# Patient Record
Sex: MALE | Race: WHITE | Employment: OTHER
[De-identification: names, ages, dates, MRNs, and addresses within clinical notes are randomized per-mention and may not be internally consistent; named-entity substitution may affect disease eponyms.]

---

## 2022-10-31 ENCOUNTER — TRANSCRIBE ORDERS (OUTPATIENT)
Dept: OTHER | Age: 67
End: 2022-10-31

## 2022-10-31 DIAGNOSIS — R26.81 UNSTEADINESS ON FEET: Primary | ICD-10-CM

## 2022-12-30 ENCOUNTER — HOSPITAL ENCOUNTER (OUTPATIENT)
Dept: OCCUPATIONAL THERAPY | Facility: REHABILITATION | Age: 67
Discharge: HOME | End: 2022-12-30
Attending: PHYSICIAN ASSISTANT
Payer: COMMERCIAL

## 2022-12-30 DIAGNOSIS — Z78.9 DECREASED ACTIVITIES OF DAILY LIVING (ADL): Primary | ICD-10-CM

## 2022-12-30 DIAGNOSIS — R26.81 UNSTEADINESS ON FEET: ICD-10-CM

## 2022-12-30 PROCEDURE — 97165 OT EVAL LOW COMPLEX 30 MIN: CPT | Mod: GO | Performed by: OCCUPATIONAL THERAPIST

## 2022-12-30 PROCEDURE — 97112 NEUROMUSCULAR REEDUCATION: CPT | Mod: GO | Performed by: OCCUPATIONAL THERAPIST

## 2022-12-30 NOTE — PROGRESS NOTES
[facility]          OUTPATIENT OCCUPATIONAL THERAPY  EVALUATION  PLAN OF TREATMENT FOR OUTPATIENT REHABILITATION  (COMPLETE FOR INITIAL CLAIMS ONLY)  Patient's Last Name, First Name, M.I.  YOB: 1955  Дмитрий Jessica                        Provider's Name  [facility] Medical Record No.  [id number]                               Onset Date:     10/28/22   Start of Care Date:     12/30/22   Type:     ___PT   _X_OT   ___SLP Medical Diagnosis:     unsteadiness                          OT Diagnosis:     unsteadiness on feet, decreased ADL and IADL function Visits from SOC:  1   _________________________________________________________________________________  Plan of Treatment/Functional Goals:  Neuromuscular re-education     Goals  Goal Description: Patient will be able to bend to get dressed without unsteadiness  Target Date: 01/29/23    Therapy Frequency: one visit     Predicted Duration of Therapy Intervention (days/wks): 4 weeks  Mary Kate Becker OT          I CERTIFY THE NEED FOR THESE SERVICES FURNISHED UNDER        THIS PLAN OF TREATMENT AND WHILE UNDER MY CARE .             Physician Signature               Date    X_____________________________________________________               Certification date from: 12/30/22, Certification date to: 01/29/23               Referring Physician: Tawanna Banks PA-C     Initial Assessment        See Epic Evaluation      Start Of Care Date: 12/30/22 12/30/22 1200   Quick Adds   Quick Adds Certification;Vestibular Eval   Type of Visit Initial Outpatient Occupational Therapy Evaluation   General Information   Start Of Care Date 12/30/22   Referring Physician Tawanna Banks PA-C   Orders Evaluate and treat as indicated   Orders Date 10/28/22   Medical Diagnosis unsteadiness   Onset of Illness/Injury or Date of Surgery  "10/28/22   Precautions/Limitations Fall precautions   Surgical/Medical History Reviewed Yes   Additional Occupational Profile Info/Pertinent History of Current Problem Patient presents with unsteadiness for the past year. He has fallen 3 times with one fall resulting in fractured ribs. Patient denies ear pain, tinnitus, nausea, dizziness or vertigo. Hearing test showed some symmetrical hearing loss and patient states that ENT was not concerned. ENT referred for rehab as they feel his unsteadiness is \"likely related to decreased proprioception and is musculoskeletal in nature\".   Pain   Patient currently in pain No   Fall Risk Screen   Fall screen completed by OT   Have you fallen 2 or more times in the past year? Yes   Have you fallen and had an injury in the past year? Yes   Is patient a fall risk? Yes;Referral initiated   Fall screen comments Patient to transfer to PT after initial OT visit today.   Abuse Screen (yes response referral indicated)   Feels Unsafe at Home or Work/School no   Feels Threatened by Someone no   Does Anyone Try to Keep You From Having Contact with Others or Doing Things Outside Your Home? no   Physical Signs of Abuse Present no   Patient needs abuse support services and resources No   Visual Perception   Visual Perception Comments Patient reports normal vision. ENT noted dysconjugate eye movement   Sensation   Sensation Comments patient reports numbness in both feet   Oculomotor Exam   Smooth Pursuit Normal   Saccades Comments Normal   Convergence Testing Normal   Other Oculomotor Exam Comments ENT performed and found normal head thrust test bilaterally   Infrared Goggle Exam or Frenzel Lense Exam   Vestibular Suppressant in Last 24 Hours? No   Exam completed with Infrared Goggles   Spontaneous Nystagmus Horizontal R   Spontaneous Nystagmus comments suppresses with fixation   Gaze Evoked Nystagmus Horizontal L   Gaze Evoked Nystagmus Comments very subtle   Head Shake Horizontal Nystagmus " Negative   [place]-Hallpike (right) Negative   [place]-Hallpike (Left) Negative   HSCC Supine Roll Test (Right) Negative   HSCC Supine Roll Test (Left) Negative   Planned Therapy Interventions   Planned Therapy Interventions Neuromuscular re-education    OT Goal 1   Goal Description Patient will be able to bend to get dressed without unsteadiness   Target Date 01/29/23   Clinical Impression   Criteria for Skilled Therapeutic Interventions Met Yes, treatment indicated   OT Diagnosis unsteadiness on feet, decreased ADL and IADL function   Clinical Decision Making (Complexity) Low complexity   Therapy Frequency one visit   Predicted Duration of Therapy Intervention (days/wks) 4 weeks   Risks and Benefits of Treatment have been explained. Yes   Patient, Family & other staff in agreement with plan of care Yes   Clinical Impression Comments Patient has unsteadiness that is multifactorial in nature. Initiated HEP today and will transfer to PT as he has musculoskeletal and gait issues, as well as numbness in feet and this would be more appropriate for PT. Requested order from ENT.   Education Assessment   Barriers To Learning No Barriers   Therapy Certification   Certification date from 12/30/22   Certification date to 01/29/23   Certification I certify the need for these services furnished under this plan of treatment and while under my care.  (Physician co-signature of this document indicates review and certification of the therapy plan)   Total Evaluation Time   OT Eval, Low Complexity Minutes (48446) 25

## 2023-01-26 ENCOUNTER — HOSPITAL ENCOUNTER (OUTPATIENT)
Dept: PHYSICAL THERAPY | Facility: REHABILITATION | Age: 68
Discharge: HOME | End: 2023-01-26
Payer: COMMERCIAL

## 2023-01-26 ENCOUNTER — MEDICAL CORRESPONDENCE (OUTPATIENT)
Dept: HEALTH INFORMATION MANAGEMENT | Facility: CLINIC | Age: 68
End: 2023-01-26

## 2023-01-26 DIAGNOSIS — R26.89 BALANCE PROBLEMS: ICD-10-CM

## 2023-01-26 DIAGNOSIS — R26.89 ABNORMALITY OF GAIT DUE TO IMPAIRMENT OF BALANCE: Primary | ICD-10-CM

## 2023-01-26 PROCEDURE — 97161 PT EVAL LOW COMPLEX 20 MIN: CPT | Mod: GP

## 2023-01-26 PROCEDURE — 97112 NEUROMUSCULAR REEDUCATION: CPT | Mod: GP

## 2023-01-26 NOTE — PROGRESS NOTES
"   01/26/23 0900   Quick Adds   Quick Adds Certification   Type of Visit Initial OP PT Evaluation   General Information   Start of Care Date 01/26/23   Referring Physician Tawanna Delong PA-C   Orders Evaluate and Treat as Indicated   Order Date 01/26/23   Medical Diagnosis Unsteadiness on feet   Onset of illness/injury or Date of Surgery 01/26/23  (order date- been having symptoms for 1-2 years)   Precautions/Limitations fall precautions   Surgical/Medical history reviewed Yes   Pertinent history of current problem (include personal factors and/or comorbidities that impact the POC) Per chart review: \"Дмитрий Jessica is a 66 y.o. male with alcohol abuse, hypertension, hyperlipidemia, moderately well controlled COPD, paroxysmal atrial fibrillation (on Xarelto, status post ablation), HFpEF, nonobstructive CAD, and aortic root dilation who is presenting today with concerns for balance issues and tingling in his feet.\" Patient has been experiencing these issues for the past year. He mainly loses his balance when he is backing up or turning to the right. Also has a 2yr hx of chronic mid thoracic/low back pain which comes on after 5 minutes of standing of walking. States he is fairly sedentary throughout his day. He does have a TM that he uses daily but only for 5 minutes as that is all he can tolerate d/t pain. The tingling in his feet has gotten progressively worse over the past year. He started going to the chiropractor this week but hasn't noticed a difference yet.   Patient role/Employment history Retired  (prior )   Living environment House/[facility]  (Lives alone)   Home/Community Accessibility Comments All one level except laundry is downstairs with 1sided railing. 5-6 stairs to get into house with railing.   ADL Devices Shower/Tub Grab Bar   Patient/Family Goals Statement Improve balance   Fall Risk Screen   Fall screen completed by PT   Have you fallen 2 or more times in the past year? Yes "   Have you fallen and had an injury in the past year? Yes   Is patient a fall risk? Yes   Fall screen comments First fall was 8 months ago: backing up and fell and hit rocking chair and fractured a couple ribs. Second fall was 2 months ago: got up to go to the bathroom at night and fell into the bathtub as he couldn't see.   Pain   Patient currently in pain Yes   Pain location Mid to low back   Pain comments Chronic back pain for past 2 years. Increase in pain after standing or walking for 5 minutes.   Observation   Observation Flat affect, speaks in short sentences with 1-2 word phrases.   Posture   Posture Comments In standing maintains slight bend in both knees, mild rounding of upper back and shoulders.   Range of Motion (ROM)   ROM Comment Gross LE ROM screen WFL bilaterally, lacking end range of motion with knee extension indicating tight hamstrings B.   Strength   Strength Comments Gross strength observed through MMT showing 5/5 in all LE mm. B. Good strength throughout.   Gait   Gait Comments Ambulates with mildly shorter steps, limiting full hip extension throughout gait cycle. Maintains slight bend in hips/knees throughout. Decreased arm swing.   Gait Special Tests   Gait Special Tests FUNCTIONAL GAIT ASSESSMENT   Gait Special Tests Functional Gait Assessment Score out of 30   Score out of 30 21/30   Comments Indicates fall risk. Difficulty with tandem walking and eyes closed.   Balance   Balance Comments Overall fair balance. Has the most difficulty on uneven surfaces and when vision is compromised. 5x STS: 15.4 seconds using UEs to push off, does not stand up fully, maintaining slightly flexed posture.   Balance Special Tests   Balance Special Tests Sit to stand reps;Modified CTSIB Conditions   Balance Special Tests Modified CTSIB Conditions   Condition 1, seconds 30 Seconds   Condition 2, seconds 30 Seconds   Condition 4, seconds 30 Seconds   Condition 5, seconds 2 Seconds   Planned Therapy  Interventions   Planned Therapy Interventions balance training;gait training;neuromuscular re-education;ROM;strengthening;stretching   Clinical Impression   Criteria for Skilled Therapeutic Interventions Met yes, treatment indicated   PT Diagnosis Impaired balance affecting gait   Influenced by the following impairments Hx of long term alcohol abuse, impaired sensation in feet, impaired balance   Functional limitations due to impairments Increased risk for falls, increased falls, decreased safety in home and community   Clinical Presentation Stable/Uncomplicated   Clinical Presentation Rationale symptom report, clinical judgment   Clinical Decision Making (Complexity) Low complexity   Therapy Frequency 1 time/week  (decreasing prn)   Predicted Duration of Therapy Intervention (days/wks) 90 days   Risk & Benefits of therapy have been explained Yes   Patient, Family & other staff in agreement with plan of care Yes   Clinical Impression Comments Дмитрий is a 68 yo male who presents with impaired balance, affecting gait. He has the most difficulty with backing up, when vision is compromised, and when on uneven surfaces. He will benefit from skilled PT intervention targeting his impairments in order to decrease risk for falls and improve safety.   GOALS   PT Eval Goals 1;2;3   Goal 1   Goal Identifier FGA   Goal Description Дмитрий will improve score on FGA by 3 or more points in order to demonstrate meaningful improvement in dynamic balance.   Target Date 04/25/23   Goal 2   Goal Identifier 5x STS   Goal Description Дмитрий will improve score on 5x STS by 2 or more seconds and without UE use in order to improve functional LE strength needed for improved gait and mobility.   Target Date 04/25/23   Goal 3   Goal Identifier Uneven surfaces   Goal Description Дмитрий will maintain a NBOS on a foam pad with EC for at least 15 seconds in order to improve response of vestibular system to aid in overall balance.   Target Date  04/25/23   Total Evaluation Time   PT Nikos, Low Complexity Minutes (61148) 25   Therapy Certification   Certification date from 01/26/23   Certification date to 04/25/23   Medical Diagnosis Unsteadiness on feet   Certification I certify the need for these services furnished under this plan of treatment and while under my care.  (Physician co-signature of this document indicates review and certification of the therapy plan).       Thank you for referring Дмитрий to outpatient physical therapy. Please do not hesitate to contact me with any questions via email at [email or web address].     Rachid Burk, PT, DPT  [employer]   [email or web address]

## 2023-01-26 NOTE — PROGRESS NOTES
[facility]                                                                                   OUTPATIENT PHYSICAL THERAPY FUNCTIONAL EVALUATION  PLAN OF TREATMENT FOR OUTPATIENT REHABILITATION  (COMPLETE FOR INITIAL CLAIMS ONLY)  Patient's Last Name, First Name, M.I.  YOB: 1955  Дмитрий Jessica     Provider's Name   [facility]   Medical Record No.  [id number]     Start of Care Date:  01/26/23   Onset Date:  01/26/23 (order date- been having symptoms for 1-2 years)   Type:     _X__PT   ____OT  ____SLP Medical Diagnosis:  Unsteadiness on feet     PT Diagnosis:  Impaired balance affecting gait Visits from SOC:  1                              __________________________________________________________________________________  Plan of Treatment/Functional Goals:  balance training, gait training, neuromuscular re-education, ROM, strengthening, stretching           GOALS  FGA  Дмитрий will improve score on FGA by 3 or more points in order to demonstrate meaningful improvement in dynamic balance.  04/25/23    5x STS  Дмитрий will improve score on 5x STS by 2 or more seconds and without UE use in order to improve functional LE strength needed for improved gait and mobility.  04/25/23    Uneven surfaces  Дмитрий will maintain a NBOS on a foam pad with EC for at least 15 seconds in order to improve response of vestibular system to aid in overall balance.  04/25/23         Therapy Frequency:  1 time/week (decreasing prn)   Predicted Duration of Therapy Intervention:  90 days    NISA LAWTON, PT                                    I CERTIFY THE NEED FOR THESE SERVICES FURNISHED UNDER        THIS PLAN OF TREATMENT AND WHILE UNDER MY CARE .             Physician Signature               Date    X_____________________________________________________                      Certification  Date From:  01/26/23   Certification Date To:  04/25/23    Referring Provider:  Tawanna Delong PA-C    Initial Assessment  See Epic Evaluation- Start of Care Date: 01/26/23

## 2023-01-27 ENCOUNTER — TRANSCRIBE ORDERS (OUTPATIENT)
Dept: OTHER | Age: 68
End: 2023-01-27

## 2023-01-27 DIAGNOSIS — R26.81 UNSTEADINESS ON FEET: Primary | ICD-10-CM

## 2023-02-13 NOTE — PROGRESS NOTES
[facility]    Outpatient Occupational Therapy Discharge Note  Patient: Дмитрий Jessica  : 1955    Beginning/End Dates of Reporting Period:  22     Referring Provider: Tawanna Banks PA-C    Therapy Diagnosis: unsteadiness on feet, decreased ADL and IADL function    Goals: N/A. Patient seen for initial visit and transferred to PT    Plan:  Discharge from therapy.

## 2023-02-13 NOTE — ADDENDUM NOTE
Encounter addended by: Mary Kate Becker, OT on: 2/12/2023 7:25 PM   Actions taken: Episode resolved, Clinical Note Signed, Flowsheet accepted

## 2023-03-15 ENCOUNTER — HOSPITAL ENCOUNTER (OUTPATIENT)
Dept: PHYSICAL THERAPY | Facility: REHABILITATION | Age: 68
Discharge: HOME | End: 2023-03-15
Payer: COMMERCIAL

## 2023-03-15 DIAGNOSIS — R26.89 ABNORMALITY OF GAIT DUE TO IMPAIRMENT OF BALANCE: ICD-10-CM

## 2023-03-15 DIAGNOSIS — R26.89 BALANCE PROBLEMS: ICD-10-CM

## 2023-03-15 DIAGNOSIS — M62.81 GENERALIZED MUSCLE WEAKNESS: Primary | ICD-10-CM

## 2023-03-15 PROCEDURE — 97110 THERAPEUTIC EXERCISES: CPT | Mod: CQ | Performed by: PHYSICAL THERAPY ASSISTANT

## 2023-03-15 PROCEDURE — 97112 NEUROMUSCULAR REEDUCATION: CPT | Mod: CQ | Performed by: PHYSICAL THERAPY ASSISTANT

## 2023-03-22 ENCOUNTER — HOSPITAL ENCOUNTER (OUTPATIENT)
Dept: PHYSICAL THERAPY | Facility: REHABILITATION | Age: 68
Discharge: HOME | End: 2023-03-22
Payer: COMMERCIAL

## 2023-03-22 DIAGNOSIS — R26.89 BALANCE PROBLEMS: ICD-10-CM

## 2023-03-22 DIAGNOSIS — M62.81 GENERALIZED MUSCLE WEAKNESS: Primary | ICD-10-CM

## 2023-03-22 DIAGNOSIS — R26.89 ABNORMALITY OF GAIT DUE TO IMPAIRMENT OF BALANCE: ICD-10-CM

## 2023-03-22 PROCEDURE — 97110 THERAPEUTIC EXERCISES: CPT | Mod: CQ | Performed by: PHYSICAL THERAPY ASSISTANT

## 2023-03-22 PROCEDURE — 97112 NEUROMUSCULAR REEDUCATION: CPT | Mod: CQ | Performed by: PHYSICAL THERAPY ASSISTANT

## 2023-03-29 ENCOUNTER — HOSPITAL ENCOUNTER (OUTPATIENT)
Dept: PHYSICAL THERAPY | Facility: REHABILITATION | Age: 68
Discharge: HOME | End: 2023-03-29
Payer: COMMERCIAL

## 2023-03-29 DIAGNOSIS — R26.89 BALANCE PROBLEMS: ICD-10-CM

## 2023-03-29 DIAGNOSIS — M62.81 GENERALIZED MUSCLE WEAKNESS: Primary | ICD-10-CM

## 2023-03-29 DIAGNOSIS — R26.89 ABNORMALITY OF GAIT DUE TO IMPAIRMENT OF BALANCE: ICD-10-CM

## 2023-03-29 PROCEDURE — 97110 THERAPEUTIC EXERCISES: CPT | Mod: CQ | Performed by: PHYSICAL THERAPY ASSISTANT

## 2023-03-29 PROCEDURE — 97112 NEUROMUSCULAR REEDUCATION: CPT | Mod: CQ | Performed by: PHYSICAL THERAPY ASSISTANT

## 2023-04-20 ENCOUNTER — HOSPITAL ENCOUNTER (OUTPATIENT)
Dept: PHYSICAL THERAPY | Facility: REHABILITATION | Age: 68
Discharge: HOME | End: 2023-04-20
Payer: COMMERCIAL

## 2023-04-20 DIAGNOSIS — M62.81 GENERALIZED MUSCLE WEAKNESS: Primary | ICD-10-CM

## 2023-04-20 DIAGNOSIS — R26.89 BALANCE PROBLEMS: ICD-10-CM

## 2023-04-20 DIAGNOSIS — R26.89 ABNORMALITY OF GAIT DUE TO IMPAIRMENT OF BALANCE: ICD-10-CM

## 2023-04-20 PROCEDURE — 97112 NEUROMUSCULAR REEDUCATION: CPT | Mod: GP | Performed by: PHYSICAL THERAPY ASSISTANT

## 2023-04-20 PROCEDURE — 97110 THERAPEUTIC EXERCISES: CPT | Mod: GP | Performed by: PHYSICAL THERAPY ASSISTANT

## 2023-04-27 ENCOUNTER — HOSPITAL ENCOUNTER (OUTPATIENT)
Dept: PHYSICAL THERAPY | Facility: REHABILITATION | Age: 68
Discharge: HOME | End: 2023-04-27
Payer: COMMERCIAL

## 2023-04-27 DIAGNOSIS — M62.81 GENERALIZED MUSCLE WEAKNESS: ICD-10-CM

## 2023-04-27 DIAGNOSIS — R26.89 ABNORMALITY OF GAIT DUE TO IMPAIRMENT OF BALANCE: Primary | ICD-10-CM

## 2023-04-27 DIAGNOSIS — R26.89 BALANCE PROBLEMS: ICD-10-CM

## 2023-04-27 PROCEDURE — 97112 NEUROMUSCULAR REEDUCATION: CPT | Mod: GP

## 2023-04-27 PROCEDURE — 97750 PHYSICAL PERFORMANCE TEST: CPT | Mod: GP

## 2023-04-27 PROCEDURE — 97110 THERAPEUTIC EXERCISES: CPT | Mod: GP

## 2023-04-28 NOTE — PROGRESS NOTES
[facility]    OUTPATIENT PHYSICAL THERAPY  PLAN OF TREATMENT FOR OUTPATIENT REHABILITATION AND PROGRESS NOTE           Patient's Last Name, First Name, Дмитрий Malloy Date of Birth  1955   Provider's Name  [facility] Medical Record No.  [id number]    Onset Date  1/26/2023 Start of Care Date  1/26/2023   Type:     _X_PT   ___OT   ___SLP Medical Diagnosis  Unsteadiness on feet   PT Diagnosis  Impaired balance affecting gait Plan of Treatment  Frequency/Duration: 1x/week for 90 days  Certification date from 4/25/2023 to 7/24/2023     Goals:  Goal Identifier FGA   Goal Description Дмитрий will improve score on FGA by 3 or more points in order to demonstrate meaningful improvement in dynamic balance.   Target Date 04/25/23   Date Met      Progress (detail required for progress note): 22/30 today: 21/30 at initial evaluation.     Goal Identifier 5x STS   Goal Description Дмитрий will improve score on 5x STS by 2 or more seconds and without UE use in order to improve functional LE strength needed for improved gait and mobility.   Target Date 04/25/23   Date Met      Progress (detail required for progress note): 5x STS with UE use: 13.4 seconds. Initial eval: 15.4 seconds. 2 second improvement showing progress.     Goal Identifier Uneven surfaces   Goal Description Дмитрий will maintain a NBOS on a foam pad with EC for at least 15 seconds in order to improve response of vestibular system to aid in overall balance.   Target Date 04/25/23   Date Met      Progress (detail required for progress note): 30 seconds with close CGA and min to mod body sway. Initial eval: 2 seconds. Great improvement.       Beginning/End Dates of Progress Note Reporting Period:  1/26/2023 to 4/27/2023    Progress Toward Goals:   Дмитрий has made great progress during this reporting period.  He improved great with his NBOS with EC on a foam pad goal, improving from 2 seconds to 30 seconds with close CGA. This shows that his vestibular system has improved and he is more comfortable on uneven surfaces and in dark spaces. He also improved by 2 seconds on his STS goal, showing improved functional LE strength. He improved by 1 point on his FGA goal, showing slight improvement. Дмитрий continues to benefit from skilled PT intervention targeting his strength and balance in order to further reduce risk for falls and improve safety with mobility.     Client Self (Subjective) Report for Progress Note Reporting Period: Nothing new to report today.         I CERTIFY THE NEED FOR THESE SERVICES FURNISHED UNDER        THIS PLAN OF TREATMENT AND WHILE UNDER MY CARE .             Physician Signature               Date    X_____________________________________________________                      Referring Provider: MARTHA Carmona PT

## 2023-05-04 ENCOUNTER — HOSPITAL ENCOUNTER (OUTPATIENT)
Dept: PHYSICAL THERAPY | Facility: REHABILITATION | Age: 68
Discharge: HOME | End: 2023-05-04
Payer: COMMERCIAL

## 2023-05-04 DIAGNOSIS — R26.89 BALANCE PROBLEMS: ICD-10-CM

## 2023-05-04 DIAGNOSIS — M62.81 GENERALIZED MUSCLE WEAKNESS: Primary | ICD-10-CM

## 2023-05-04 DIAGNOSIS — R26.89 ABNORMALITY OF GAIT DUE TO IMPAIRMENT OF BALANCE: ICD-10-CM

## 2023-05-04 PROCEDURE — 97110 THERAPEUTIC EXERCISES: CPT | Mod: GP | Performed by: PHYSICAL THERAPY ASSISTANT

## 2023-05-04 PROCEDURE — 97112 NEUROMUSCULAR REEDUCATION: CPT | Mod: GP | Performed by: PHYSICAL THERAPY ASSISTANT

## 2023-05-10 ENCOUNTER — HOSPITAL ENCOUNTER (OUTPATIENT)
Dept: PHYSICAL THERAPY | Facility: REHABILITATION | Age: 68
Discharge: HOME | End: 2023-05-10
Payer: COMMERCIAL

## 2023-05-10 DIAGNOSIS — R26.89 ABNORMALITY OF GAIT DUE TO IMPAIRMENT OF BALANCE: ICD-10-CM

## 2023-05-10 DIAGNOSIS — M62.81 GENERALIZED MUSCLE WEAKNESS: Primary | ICD-10-CM

## 2023-05-10 DIAGNOSIS — R26.89 BALANCE PROBLEMS: ICD-10-CM

## 2023-05-10 PROCEDURE — 97112 NEUROMUSCULAR REEDUCATION: CPT | Mod: GP

## 2023-05-10 PROCEDURE — 97110 THERAPEUTIC EXERCISES: CPT | Mod: GP

## 2023-05-17 ENCOUNTER — THERAPY VISIT (OUTPATIENT)
Dept: PHYSICAL THERAPY | Facility: REHABILITATION | Age: 68
End: 2023-05-17
Payer: COMMERCIAL

## 2023-05-17 DIAGNOSIS — R26.89 ABNORMALITY OF GAIT DUE TO IMPAIRMENT OF BALANCE: ICD-10-CM

## 2023-05-17 DIAGNOSIS — M62.81 GENERALIZED MUSCLE WEAKNESS: Primary | ICD-10-CM

## 2023-05-17 DIAGNOSIS — R26.89 BALANCE PROBLEMS: ICD-10-CM

## 2023-05-17 PROCEDURE — 97110 THERAPEUTIC EXERCISES: CPT | Mod: GP | Performed by: PHYSICAL THERAPY ASSISTANT

## 2023-05-17 PROCEDURE — 97112 NEUROMUSCULAR REEDUCATION: CPT | Mod: GP | Performed by: PHYSICAL THERAPY ASSISTANT

## 2023-06-05 NOTE — ADDENDUM NOTE
Encounter addended by: Gabby Perez PTA on: 6/5/2023 3:19 PM   Actions taken: Charge Capture section accepted

## 2023-09-02 ENCOUNTER — APPOINTMENT (OUTPATIENT)
Dept: GENERAL RADIOLOGY | Facility: OTHER | Age: 68
DRG: 309 | End: 2023-09-02
Attending: EMERGENCY MEDICINE
Payer: COMMERCIAL

## 2023-09-02 ENCOUNTER — HOSPITAL ENCOUNTER (INPATIENT)
Facility: OTHER | Age: 68
LOS: 5 days | Discharge: HOME | DRG: 309 | End: 2023-09-07
Attending: EMERGENCY MEDICINE | Admitting: INTERNAL MEDICINE
Payer: COMMERCIAL

## 2023-09-02 DIAGNOSIS — R65.10 SIRS (SYSTEMIC INFLAMMATORY RESPONSE SYNDROME) (H): ICD-10-CM

## 2023-09-02 DIAGNOSIS — R50.9 FEVER, UNSPECIFIED FEVER CAUSE: ICD-10-CM

## 2023-09-02 DIAGNOSIS — I48.91 ATRIAL FIBRILLATION WITH RVR (H): ICD-10-CM

## 2023-09-02 PROBLEM — Q25.43 AORTIC ROOT ANEURYSM: Status: ACTIVE | Noted: 2018-01-01

## 2023-09-02 PROBLEM — J44.89 COPD WITH ASTHMA (H): Status: ACTIVE | Noted: 2019-08-28

## 2023-09-02 PROBLEM — F10.10 ALCOHOL ABUSE: Status: ACTIVE | Noted: 2020-01-28

## 2023-09-02 PROBLEM — S22.080A COMPRESSION FRACTURE OF T12 VERTEBRA (H): Status: ACTIVE | Noted: 2021-12-07

## 2023-09-02 PROBLEM — I70.0 ATHEROSCLEROSIS OF AORTA (H): Status: ACTIVE | Noted: 2022-12-19

## 2023-09-02 PROBLEM — E66.9 OBESITY (BMI 30-39.9): Status: ACTIVE | Noted: 2020-01-29

## 2023-09-02 PROBLEM — I10 HTN, GOAL BELOW 130/80: Status: ACTIVE | Noted: 2018-01-01

## 2023-09-02 PROBLEM — K22.70 BARRETT'S ESOPHAGUS: Status: ACTIVE | Noted: 2021-12-07

## 2023-09-02 PROBLEM — F10.930 ALCOHOL WITHDRAWAL SYNDROME WITHOUT COMPLICATION (H): Status: ACTIVE | Noted: 2021-03-31

## 2023-09-02 PROBLEM — R73.01 IFG (IMPAIRED FASTING GLUCOSE): Status: ACTIVE | Noted: 2023-09-02

## 2023-09-02 PROBLEM — E78.5 DYSLIPIDEMIA: Status: ACTIVE | Noted: 2020-02-12

## 2023-09-02 LAB
ALBUMIN SERPL BCG-MCNC: 3.9 G/DL (ref 3.5–5.2)
ALBUMIN UR-MCNC: 10 MG/DL
ALP SERPL-CCNC: 84 U/L (ref 40–129)
ALT SERPL W P-5'-P-CCNC: 21 U/L (ref 0–70)
ANION GAP SERPL CALCULATED.3IONS-SCNC: 15 MMOL/L (ref 7–15)
APPEARANCE UR: CLEAR
AST SERPL W P-5'-P-CCNC: 32 U/L (ref 0–45)
BASE EXCESS BLDV CALC-SCNC: 0.9 MMOL/L (ref -7.7–1.9)
BASOPHILS # BLD AUTO: 0 10E3/UL (ref 0–0.2)
BASOPHILS NFR BLD AUTO: 1 %
BILIRUB SERPL-MCNC: 2.2 MG/DL
BILIRUB UR QL STRIP: NEGATIVE
BUN SERPL-MCNC: 16 MG/DL (ref 8–23)
CALCIUM SERPL-MCNC: 9.2 MG/DL (ref 8.8–10.2)
CHLORIDE SERPL-SCNC: 95 MMOL/L (ref 98–107)
COLOR UR AUTO: YELLOW
CREAT SERPL-MCNC: 0.98 MG/DL (ref 0.67–1.17)
DEPRECATED HCO3 PLAS-SCNC: 23 MMOL/L (ref 22–29)
EOSINOPHIL # BLD AUTO: 0 10E3/UL (ref 0–0.7)
EOSINOPHIL NFR BLD AUTO: 0 %
ERYTHROCYTE [DISTWIDTH] IN BLOOD BY AUTOMATED COUNT: 13.8 % (ref 10–15)
ETHANOL SERPL-MCNC: <0.01 G/DL
FLUAV RNA SPEC QL NAA+PROBE: NEGATIVE
FLUBV RNA RESP QL NAA+PROBE: NEGATIVE
GFR SERPL CREATININE-BSD FRML MDRD: 84 ML/MIN/1.73M2
GLUCOSE SERPL-MCNC: 115 MG/DL (ref 70–99)
GLUCOSE UR STRIP-MCNC: NEGATIVE MG/DL
HCO3 BLDV-SCNC: 26 MMOL/L (ref 21–28)
HCT VFR BLD AUTO: 43.6 % (ref 40–53)
HGB BLD-MCNC: 15.2 G/DL (ref 13.3–17.7)
HGB UR QL STRIP: NEGATIVE
HOLD SPECIMEN: NORMAL
IMM GRANULOCYTES # BLD: 0 10E3/UL
IMM GRANULOCYTES NFR BLD: 0 %
KETONES UR STRIP-MCNC: 40 MG/DL
LACTATE SERPL-SCNC: 1.3 MMOL/L (ref 0.7–2)
LEUKOCYTE ESTERASE UR QL STRIP: NEGATIVE
LYMPHOCYTES # BLD AUTO: 1.3 10E3/UL (ref 0.8–5.3)
LYMPHOCYTES NFR BLD AUTO: 18 %
MAGNESIUM SERPL-MCNC: 1.4 MG/DL (ref 1.7–2.3)
MCH RBC QN AUTO: 32 PG (ref 26.5–33)
MCHC RBC AUTO-ENTMCNC: 34.9 G/DL (ref 31.5–36.5)
MCV RBC AUTO: 92 FL (ref 78–100)
MONOCYTES # BLD AUTO: 1.1 10E3/UL (ref 0–1.3)
MONOCYTES NFR BLD AUTO: 15 %
MUCOUS THREADS #/AREA URNS LPF: PRESENT /LPF
NEUTROPHILS # BLD AUTO: 4.8 10E3/UL (ref 1.6–8.3)
NEUTROPHILS NFR BLD AUTO: 66 %
NITRATE UR QL: NEGATIVE
NRBC # BLD AUTO: 0 10E3/UL
NRBC BLD AUTO-RTO: 0 /100
O2/TOTAL GAS SETTING VFR VENT: 21 %
OXYHGB MFR BLDV: 75 % (ref 70–75)
PCO2 BLDV: 40 MM HG (ref 40–50)
PH BLDV: 7.41 [PH] (ref 7.32–7.43)
PH UR STRIP: 6 [PH] (ref 5–9)
PLATELET # BLD AUTO: 190 10E3/UL (ref 150–450)
PO2 BLDV: 42 MM HG (ref 25–47)
POTASSIUM SERPL-SCNC: 4 MMOL/L (ref 3.4–5.3)
PROCALCITONIN SERPL IA-MCNC: 0.48 NG/ML
PROT SERPL-MCNC: 6.8 G/DL (ref 6.4–8.3)
RBC # BLD AUTO: 4.75 10E6/UL (ref 4.4–5.9)
RBC URINE: <1 /HPF
RSV RNA SPEC NAA+PROBE: NEGATIVE
SARS-COV-2 RNA RESP QL NAA+PROBE: NEGATIVE
SODIUM SERPL-SCNC: 133 MMOL/L (ref 136–145)
SP GR UR STRIP: 1.02 (ref 1–1.03)
UROBILINOGEN UR STRIP-MCNC: 2 MG/DL
WBC # BLD AUTO: 7.2 10E3/UL (ref 4–11)
WBC URINE: 2 /HPF

## 2023-09-02 PROCEDURE — 258N000003 HC RX IP 258 OP 636: Performed by: EMERGENCY MEDICINE

## 2023-09-02 PROCEDURE — 250N000009 HC RX 250: Performed by: EMERGENCY MEDICINE

## 2023-09-02 PROCEDURE — 83605 ASSAY OF LACTIC ACID: CPT | Performed by: EMERGENCY MEDICINE

## 2023-09-02 PROCEDURE — 94640 AIRWAY INHALATION TREATMENT: CPT

## 2023-09-02 PROCEDURE — 250N000009 HC RX 250: Performed by: INTERNAL MEDICINE

## 2023-09-02 PROCEDURE — 87637 SARSCOV2&INF A&B&RSV AMP PRB: CPT | Performed by: EMERGENCY MEDICINE

## 2023-09-02 PROCEDURE — 999N000157 HC STATISTIC RCP TIME EA 10 MIN

## 2023-09-02 PROCEDURE — 83735 ASSAY OF MAGNESIUM: CPT | Performed by: EMERGENCY MEDICINE

## 2023-09-02 PROCEDURE — 84145 PROCALCITONIN (PCT): CPT | Performed by: INTERNAL MEDICINE

## 2023-09-02 PROCEDURE — 96376 TX/PRO/DX INJ SAME DRUG ADON: CPT

## 2023-09-02 PROCEDURE — 96365 THER/PROPH/DIAG IV INF INIT: CPT | Mod: XS

## 2023-09-02 PROCEDURE — 250N000011 HC RX IP 250 OP 636: Performed by: EMERGENCY MEDICINE

## 2023-09-02 PROCEDURE — 82077 ASSAY SPEC XCP UR&BREATH IA: CPT | Performed by: EMERGENCY MEDICINE

## 2023-09-02 PROCEDURE — 250N000011 HC RX IP 250 OP 636: Mod: JZ | Performed by: INTERNAL MEDICINE

## 2023-09-02 PROCEDURE — 87040 BLOOD CULTURE FOR BACTERIA: CPT | Performed by: EMERGENCY MEDICINE

## 2023-09-02 PROCEDURE — 93005 ELECTROCARDIOGRAM TRACING: CPT

## 2023-09-02 PROCEDURE — 99291 CRITICAL CARE FIRST HOUR: CPT | Mod: 25

## 2023-09-02 PROCEDURE — 200N000001 HC R&B ICU

## 2023-09-02 PROCEDURE — 36415 COLL VENOUS BLD VENIPUNCTURE: CPT | Performed by: EMERGENCY MEDICINE

## 2023-09-02 PROCEDURE — 96375 TX/PRO/DX INJ NEW DRUG ADDON: CPT

## 2023-09-02 PROCEDURE — 93010 ELECTROCARDIOGRAM REPORT: CPT | Performed by: STUDENT IN AN ORGANIZED HEALTH CARE EDUCATION/TRAINING PROGRAM

## 2023-09-02 PROCEDURE — 71045 X-RAY EXAM CHEST 1 VIEW: CPT | Mod: TC

## 2023-09-02 PROCEDURE — C9803 HOPD COVID-19 SPEC COLLECT: HCPCS

## 2023-09-02 PROCEDURE — 82805 BLOOD GASES W/O2 SATURATION: CPT | Performed by: EMERGENCY MEDICINE

## 2023-09-02 PROCEDURE — 85025 COMPLETE CBC W/AUTO DIFF WBC: CPT | Performed by: EMERGENCY MEDICINE

## 2023-09-02 PROCEDURE — 81001 URINALYSIS AUTO W/SCOPE: CPT | Performed by: EMERGENCY MEDICINE

## 2023-09-02 PROCEDURE — 80053 COMPREHEN METABOLIC PANEL: CPT | Performed by: EMERGENCY MEDICINE

## 2023-09-02 PROCEDURE — 250N000013 HC RX MED GY IP 250 OP 250 PS 637: Performed by: INTERNAL MEDICINE

## 2023-09-02 PROCEDURE — 96361 HYDRATE IV INFUSION ADD-ON: CPT

## 2023-09-02 PROCEDURE — 99291 CRITICAL CARE FIRST HOUR: CPT | Performed by: EMERGENCY MEDICINE

## 2023-09-02 RX ORDER — FLUOROMETHOLONE 0.1 %
1 SUSPENSION, DROPS(FINAL DOSAGE FORM)(ML) OPHTHALMIC (EYE) WEEKLY
Status: ON HOLD | COMMUNITY
Start: 2022-11-28 | End: 2023-09-03

## 2023-09-02 RX ORDER — DILTIAZEM HYDROCHLORIDE 100 MG/1
5-15 INJECTION, POWDER, LYOPHILIZED, FOR SOLUTION INTRAVENOUS CONTINUOUS
Status: DISCONTINUED | OUTPATIENT
Start: 2023-09-02 | End: 2023-09-05

## 2023-09-02 RX ORDER — ONDANSETRON 4 MG/1
4 TABLET, ORALLY DISINTEGRATING ORAL EVERY 6 HOURS PRN
Status: DISCONTINUED | OUTPATIENT
Start: 2023-09-02 | End: 2023-09-07 | Stop reason: HOSPADM

## 2023-09-02 RX ORDER — ATORVASTATIN CALCIUM 40 MG/1
80 TABLET, FILM COATED ORAL DAILY
Status: DISCONTINUED | OUTPATIENT
Start: 2023-09-03 | End: 2023-09-07 | Stop reason: HOSPADM

## 2023-09-02 RX ORDER — IPRATROPIUM BROMIDE AND ALBUTEROL SULFATE 2.5; .5 MG/3ML; MG/3ML
3 SOLUTION RESPIRATORY (INHALATION) ONCE
Status: COMPLETED | OUTPATIENT
Start: 2023-09-02 | End: 2023-09-02

## 2023-09-02 RX ORDER — OLODATEROL RESPIMAT INHALATION SPRAY 2.5 UG/1
2 SPRAY, METERED RESPIRATORY (INHALATION) DAILY
Status: ON HOLD | COMMUNITY
End: 2023-09-03

## 2023-09-02 RX ORDER — PANTOPRAZOLE SODIUM 40 MG/1
40 TABLET, DELAYED RELEASE ORAL DAILY
COMMUNITY
Start: 2022-12-19

## 2023-09-02 RX ORDER — ALBUTEROL SULFATE 90 UG/1
2 AEROSOL, METERED RESPIRATORY (INHALATION) EVERY 4 HOURS PRN
COMMUNITY

## 2023-09-02 RX ORDER — LORAZEPAM 2 MG/ML
1-2 INJECTION INTRAMUSCULAR EVERY 30 MIN PRN
Status: DISCONTINUED | OUTPATIENT
Start: 2023-09-02 | End: 2023-09-07 | Stop reason: HOSPADM

## 2023-09-02 RX ORDER — FOLIC ACID 1 MG/1
1 TABLET ORAL DAILY
Status: DISCONTINUED | OUTPATIENT
Start: 2023-09-03 | End: 2023-09-07 | Stop reason: HOSPADM

## 2023-09-02 RX ORDER — LORAZEPAM 2 MG/ML
2 INJECTION INTRAMUSCULAR ONCE
Status: COMPLETED | OUTPATIENT
Start: 2023-09-02 | End: 2023-09-02

## 2023-09-02 RX ORDER — LIDOCAINE 40 MG/G
CREAM TOPICAL
Status: DISCONTINUED | OUTPATIENT
Start: 2023-09-02 | End: 2023-09-07 | Stop reason: HOSPADM

## 2023-09-02 RX ORDER — LANOLIN ALCOHOL/MO/W.PET/CERES
100 CREAM (GRAM) TOPICAL DAILY
COMMUNITY
Start: 2022-12-19

## 2023-09-02 RX ORDER — AMPICILLIN TRIHYDRATE 500 MG
1000 CAPSULE ORAL DAILY
COMMUNITY
Start: 2022-05-24

## 2023-09-02 RX ORDER — MULTIPLE VITAMINS W/ MINERALS TAB 9MG-400MCG
1 TAB ORAL DAILY
Status: DISCONTINUED | OUTPATIENT
Start: 2023-09-03 | End: 2023-09-07 | Stop reason: HOSPADM

## 2023-09-02 RX ORDER — MAGNESIUM SULFATE HEPTAHYDRATE 40 MG/ML
2 INJECTION, SOLUTION INTRAVENOUS ONCE
Status: COMPLETED | OUTPATIENT
Start: 2023-09-02 | End: 2023-09-02

## 2023-09-02 RX ORDER — FLUMAZENIL 0.1 MG/ML
0.2 INJECTION, SOLUTION INTRAVENOUS
Status: DISCONTINUED | OUTPATIENT
Start: 2023-09-02 | End: 2023-09-07 | Stop reason: HOSPADM

## 2023-09-02 RX ORDER — PANTOPRAZOLE SODIUM 40 MG/1
40 TABLET, DELAYED RELEASE ORAL
Status: DISCONTINUED | OUTPATIENT
Start: 2023-09-03 | End: 2023-09-07 | Stop reason: HOSPADM

## 2023-09-02 RX ORDER — ACETAMINOPHEN 325 MG/1
650 TABLET ORAL EVERY 4 HOURS PRN
Status: DISCONTINUED | OUTPATIENT
Start: 2023-09-02 | End: 2023-09-07 | Stop reason: HOSPADM

## 2023-09-02 RX ORDER — METOPROLOL TARTRATE 1 MG/ML
5 INJECTION, SOLUTION INTRAVENOUS EVERY 6 HOURS PRN
Status: DISCONTINUED | OUTPATIENT
Start: 2023-09-02 | End: 2023-09-03

## 2023-09-02 RX ORDER — DILTIAZEM HYDROCHLORIDE 5 MG/ML
25 INJECTION INTRAVENOUS ONCE
Status: COMPLETED | OUTPATIENT
Start: 2023-09-02 | End: 2023-09-02

## 2023-09-02 RX ORDER — OLANZAPINE 5 MG/1
5-10 TABLET, ORALLY DISINTEGRATING ORAL EVERY 6 HOURS PRN
Status: DISCONTINUED | OUTPATIENT
Start: 2023-09-02 | End: 2023-09-07 | Stop reason: HOSPADM

## 2023-09-02 RX ORDER — LANOLIN ALCOHOL/MO/W.PET/CERES
5 CREAM (GRAM) TOPICAL
Status: DISCONTINUED | OUTPATIENT
Start: 2023-09-02 | End: 2023-09-07 | Stop reason: HOSPADM

## 2023-09-02 RX ORDER — DILTIAZEM HYDROCHLORIDE 5 MG/ML
20 INJECTION INTRAVENOUS ONCE
Status: COMPLETED | OUTPATIENT
Start: 2023-09-02 | End: 2023-09-02

## 2023-09-02 RX ORDER — FUROSEMIDE 10 MG/ML
40 INJECTION INTRAMUSCULAR; INTRAVENOUS EVERY 12 HOURS
Status: DISCONTINUED | OUTPATIENT
Start: 2023-09-02 | End: 2023-09-05

## 2023-09-02 RX ORDER — PIPERACILLIN SODIUM, TAZOBACTAM SODIUM 4; .5 G/20ML; G/20ML
4.5 INJECTION, POWDER, LYOPHILIZED, FOR SOLUTION INTRAVENOUS ONCE
Status: COMPLETED | OUTPATIENT
Start: 2023-09-02 | End: 2023-09-02

## 2023-09-02 RX ORDER — LOSARTAN POTASSIUM 50 MG/1
50 TABLET ORAL 2 TIMES DAILY
COMMUNITY
Start: 2023-06-29

## 2023-09-02 RX ORDER — NALTREXONE HYDROCHLORIDE 50 MG/1
50 TABLET, FILM COATED ORAL DAILY
Status: ON HOLD | COMMUNITY
Start: 2022-12-19 | End: 2023-09-03

## 2023-09-02 RX ORDER — LORAZEPAM 1 MG/1
1-2 TABLET ORAL EVERY 30 MIN PRN
Status: DISCONTINUED | OUTPATIENT
Start: 2023-09-02 | End: 2023-09-07 | Stop reason: HOSPADM

## 2023-09-02 RX ORDER — HALOPERIDOL 5 MG/ML
2.5-5 INJECTION INTRAMUSCULAR EVERY 6 HOURS PRN
Status: DISCONTINUED | OUTPATIENT
Start: 2023-09-02 | End: 2023-09-07 | Stop reason: HOSPADM

## 2023-09-02 RX ORDER — ONDANSETRON 2 MG/ML
4 INJECTION INTRAMUSCULAR; INTRAVENOUS EVERY 6 HOURS PRN
Status: DISCONTINUED | OUTPATIENT
Start: 2023-09-02 | End: 2023-09-07 | Stop reason: HOSPADM

## 2023-09-02 RX ORDER — CEFAZOLIN SODIUM 1 G/50ML
2000 SOLUTION INTRAVENOUS ONCE
Status: COMPLETED | OUTPATIENT
Start: 2023-09-02 | End: 2023-09-02

## 2023-09-02 RX ORDER — ROSUVASTATIN CALCIUM 20 MG/1
20 TABLET, COATED ORAL DAILY
COMMUNITY
Start: 2022-11-23

## 2023-09-02 RX ADMIN — LORAZEPAM 1 MG: 1 TABLET ORAL at 22:53

## 2023-09-02 RX ADMIN — APIXABAN 5 MG: 5 TABLET, FILM COATED ORAL at 22:53

## 2023-09-02 RX ADMIN — DILTIAZEM HYDROCHLORIDE 20 MG: 5 INJECTION INTRAVENOUS at 17:30

## 2023-09-02 RX ADMIN — ACETAMINOPHEN 650 MG: 325 TABLET, FILM COATED ORAL at 22:58

## 2023-09-02 RX ADMIN — METOPROLOL TARTRATE 5 MG: 1 INJECTION, SOLUTION INTRAVENOUS at 21:41

## 2023-09-02 RX ADMIN — LORAZEPAM 2 MG: 2 INJECTION INTRAMUSCULAR; INTRAVENOUS at 18:22

## 2023-09-02 RX ADMIN — DILTIAZEM HYDROCHLORIDE 25 MG: 5 INJECTION INTRAVENOUS at 18:45

## 2023-09-02 RX ADMIN — VANCOMYCIN HYDROCHLORIDE 2000 MG: 1 INJECTION, POWDER, LYOPHILIZED, FOR SOLUTION INTRAVENOUS at 19:04

## 2023-09-02 RX ADMIN — DEXTROSE MONOHYDRATE 5 MG/HR: 50 INJECTION, SOLUTION INTRAVENOUS at 19:48

## 2023-09-02 RX ADMIN — IPRATROPIUM BROMIDE AND ALBUTEROL SULFATE 3 ML: .5; 3 SOLUTION RESPIRATORY (INHALATION) at 19:46

## 2023-09-02 RX ADMIN — FUROSEMIDE 40 MG: 10 INJECTION, SOLUTION INTRAMUSCULAR; INTRAVENOUS at 22:53

## 2023-09-02 RX ADMIN — PIPERACILLIN AND TAZOBACTAM 4.5 G: 4; .5 INJECTION, POWDER, LYOPHILIZED, FOR SOLUTION INTRAVENOUS at 18:05

## 2023-09-02 RX ADMIN — MAGNESIUM SULFATE HEPTAHYDRATE 2 G: 40 INJECTION, SOLUTION INTRAVENOUS at 18:23

## 2023-09-02 RX ADMIN — SODIUM CHLORIDE 3129 ML: 9 INJECTION, SOLUTION INTRAVENOUS at 17:20

## 2023-09-02 RX ADMIN — FOLIC ACID: 5 INJECTION, SOLUTION INTRAMUSCULAR; INTRAVENOUS; SUBCUTANEOUS at 18:54

## 2023-09-02 ASSESSMENT — ENCOUNTER SYMPTOMS
DYSURIA: 0
SHORTNESS OF BREATH: 1
AGITATION: 0
ARTHRALGIAS: 0
LIGHT-HEADEDNESS: 0
VOMITING: 0
FEVER: 1
PALPITATIONS: 1
NAUSEA: 0

## 2023-09-02 ASSESSMENT — ACTIVITIES OF DAILY LIVING (ADL)
ADLS_ACUITY_SCORE: 22
ADLS_ACUITY_SCORE: 33
ADLS_ACUITY_SCORE: 35
ADLS_ACUITY_SCORE: 35

## 2023-09-02 NOTE — ED PROVIDER NOTES
History     Chief Complaint   Patient presents with    Shortness of Breath    Suspected Covid     HPI  Дмитрий Jessica is a 68 year old male who comes in by private car stating he is feeling short of breath.  He says with activity today he just becomes extremely short of breath.  He started feeling poorly yesterday.  He checked his heart rate at home and it was 180.  He states he does drink alcohol the last 3 days he has been drinking very heavily, nothing since yesterday afternoon or evening.  He does not feel like he is having any troubles with withdrawals and denies any serious withdrawals in the past.  Denies headache lightheadedness dizziness nausea.  He currently is on Eliquis for paroxysmal atrial fibrillation.  He does have significant fever on arrival, he has no specific symptoms to explain what this is.  Has been staying with someone else who has URI type symptoms, but has only been exposed to them for the last 2 days.    Allergies:  Allergies   Allergen Reactions    Bacitracin Swelling, Other (See Comments) and Unknown     throat swelled up    Cephalexin Other (See Comments)    Codeine Nausea and Swelling    Rivaroxaban Other (See Comments)    Sotalol Other (See Comments)     Qt prolongation in February 2021       Problem List:    Patient Active Problem List    Diagnosis Date Noted    IFG (impaired fasting glucose) 09/02/2023     Priority: Medium     Formatting of this note might be different from the original. 111 (2013)      SIRS (systemic inflammatory response syndrome) (H) 09/02/2023     Priority: Medium    Atrial fibrillation with RVR (H) 09/02/2023     Priority: Medium    Fever, unspecified fever cause 09/02/2023     Priority: Medium    Atherosclerosis of aorta (H) 12/19/2022     Priority: Medium    Galeas's esophagus 12/07/2021     Priority: Medium     Formatting of this note might be different from the original. Diagnosed on EGD 12/7/21 Formatting of this note might be different from the  original. 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 4.5 (goal sbp <130) cardiology fu scheduled      HTN, goal below 130/80 01/01/2018     Priority: Medium     Formatting of this note might be different from the original. aortic root aneurysm      Bell's palsy 06/16/2015     Priority: Medium    Prostate cancer (H) 06/17/2010     Priority: Medium        Past Medical History:    No past medical history on file.    Past Surgical History:    No past surgical history on file.    Family History:    No family history on file.    Social History:  Marital Status:  Single [1]        Medications:    apixaban ANTICOAGULANT (ELIQUIS) 5 MG tablet  Cholecalciferol (D 1000) 25 MCG (1000 UT) CAPS  fluorometholone (FML LIQUIFILM) 0.1 % ophthalmic suspension  losartan (COZAAR) 50 MG tablet  naltrexone (DEPADE/REVIA) 50 MG tablet  pantoprazole (PROTONIX) 40 MG EC tablet  rosuvastatin (CRESTOR) 20 MG tablet  thiamine (B-1) 100 MG tablet  albuterol (PROAIR HFA/PROVENTIL HFA/VENTOLIN HFA) 108 (90 Base) MCG/ACT inhaler  Olodaterol HCl (STRIVERDI RESPIMAT) 2.5 MCG/ACT AERS          Review of Systems   Constitutional:  Positive for fever.   HENT:  Negative for congestion.    Eyes:  Negative for visual disturbance.   Respiratory:  Positive for shortness of breath.    Cardiovascular:  Positive for palpitations. Negative for chest pain.   Gastrointestinal:  Negative for  "nausea and vomiting.   Genitourinary:  Negative for dysuria.   Musculoskeletal:  Negative for arthralgias.   Skin:  Negative for rash.   Neurological:  Negative for light-headedness.   Psychiatric/Behavioral:  Negative for agitation.        Physical Exam   BP: 129/85  Pulse: 94  Temp: (!) 101.9  F (38.8  C)  Resp: 18  Height: 180.3 cm (5' 11\")  Weight: 104.3 kg (230 lb)  SpO2: 95 %      Physical Exam  Vitals and nursing note reviewed.   Constitutional:       Appearance: He is well-developed.   HENT:      Head: Normocephalic and atraumatic.      Mouth/Throat:      Mouth: Mucous membranes are moist.   Eyes:      Conjunctiva/sclera: Conjunctivae normal.   Cardiovascular:      Rate and Rhythm: Tachycardia present. Rhythm irregular.      Heart sounds: Normal heart sounds.   Pulmonary:      Effort: Pulmonary effort is normal.      Breath sounds: Normal breath sounds.   Abdominal:      General: Bowel sounds are normal.      Palpations: Abdomen is soft.   Skin:     General: Skin is warm and dry.   Neurological:      General: No focal deficit present.      Mental Status: He is alert.   Psychiatric:         Behavior: Behavior normal.         ED Course              ED Course as of 09/02/23 1929   Sat Sep 02, 2023   1835 Patient still appears to be in A-fib with RVR.  Initially when he presented he appeared to be in sinus rhythm but converted when they moved him from triage into bay 9.  He was then moved into bay 1 given the fast heart rate.  Initial dose of 20 mg IV Cardizem did bring the rate temporarily down to about 118, but it has started going back up again.  We did try a dose of Ativan given his heavy alcohol use to see if perhaps this might be in part due to alcohol withdrawal, however it does not seem to have helped.  He initially presented as if he may be septic so septic order set was initiated, he has so far received 2 L of fluids IV antibiotics.  No obvious source of infection with a normal white blood count and " normal lactic acid.  COVID influenza RSV swab also negative.  We will try a second dose of IV Cardizem, if that does not result in cardioversion, will likely need to start Cardizem drip.   1904 Repeat dose of Cardizem again only temporarily brought his heart rate down.  I am going to to start Cardizem infusion at this time.  His work-up is complete, normal urinalysis, negative multiplex viral swab, normal white count and normal lactic acid.  Comprehensive metabolic panel unremarkable.  Magnesium 1.4.  He presented as if he may be in sepsis so he was given Zosyn and vancomycin.  He has been receiving sepsis bundle fluids.  He is gotten 2 g of IV magnesium as well.  He is feeling somewhat better.  Still unclear the source of his fever.  I am going to call nocturnist to see if we can get him admitted to the ICU on Cardizem infusion.  He is stable at this time.     Procedures         EKG shows what appears to me to be a rapid atrial fibrillation.  Rate is 169 bpm.  No acute ST segment or T wave changes.  It is irregular and narrow complex.         Results for orders placed or performed during the hospital encounter of 09/02/23 (from the past 24 hour(s))   Extra Tube ([place] Draw)    Narrative    The following orders were created for panel order Extra Tube ([place] Draw).  Procedure                               Abnormality         Status                     ---------                               -----------         ------                     Extra Blood Culture Bottle[[id number]]                       Final result               Extra Blue Top Tube[[id number]]                              Final result               Extra Red Top Tube[[id number]]                               Final result               Extra Green Top (Lithium...[[id number]]                      Final result               Extra Purple Top Tube[[id number]]                            Final result                 Please view results for these tests on the  individual orders.   Extra Blood Culture Bottle   Result Value Ref Range    Hold Specimen x    Extra Blue Top Tube   Result Value Ref Range    Hold Specimen x    Extra Red Top Tube   Result Value Ref Range    Hold Specimen x    Extra Green Top (Lithium Heparin) Tube   Result Value Ref Range    Hold Specimen x    Extra Purple Top Tube   Result Value Ref Range    Hold Specimen x    CBC with platelets differential    Narrative    The following orders were created for panel order CBC with platelets differential.  Procedure                               Abnormality         Status                     ---------                               -----------         ------                     CBC with platelets and d...[[id number]]                      Final result                 Please view results for these tests on the individual orders.   Comprehensive metabolic panel   Result Value Ref Range    Sodium 133 (L) 136 - 145 mmol/L    Potassium 4.0 3.4 - 5.3 mmol/L    Chloride 95 (L) 98 - 107 mmol/L    Carbon Dioxide (CO2) 23 22 - 29 mmol/L    Anion Gap 15 7 - 15 mmol/L    Urea Nitrogen 16.0 8.0 - 23.0 mg/dL    Creatinine 0.98 0.67 - 1.17 mg/dL    Calcium 9.2 8.8 - 10.2 mg/dL    Glucose 115 (H) 70 - 99 mg/dL    Alkaline Phosphatase 84 40 - 129 U/L    AST 32 0 - 45 U/L    ALT 21 0 - 70 U/L    Protein Total 6.8 6.4 - 8.3 g/dL    Albumin 3.9 3.5 - 5.2 g/dL    Bilirubin Total 2.2 (H) <=1.2 mg/dL    GFR Estimate 84 >60 mL/min/1.73m2   Lactic acid whole blood   Result Value Ref Range    Lactic Acid 1.3 0.7 - 2.0 mmol/L   Blood gas venous and oxyhgb   Result Value Ref Range    pH Venous 7.41 7.32 - 7.43    pCO2 Venous 40 40 - 50 mm Hg    pO2 Venous 42 25 - 47 mm Hg    Bicarbonate Venous 26 21 - 28 mmol/L    FIO2 21     Oxyhemoglobin Venous 75 70 - 75 %    Base Excess/Deficit 0.9 -7.7 - 1.9 mmol/L   Ethanol GH   Result Value Ref Range    Alcohol ethyl <0.01 <=0.01 g/dL   CBC with platelets and differential   Result Value Ref Range    WBC  Count 7.2 4.0 - 11.0 10e3/uL    RBC Count 4.75 4.40 - 5.90 10e6/uL    Hemoglobin 15.2 13.3 - 17.7 g/dL    Hematocrit 43.6 40.0 - 53.0 %    MCV 92 78 - 100 fL    MCH 32.0 26.5 - 33.0 pg    MCHC 34.9 31.5 - 36.5 g/dL    RDW 13.8 10.0 - 15.0 %    Platelet Count 190 150 - 450 10e3/uL    % Neutrophils 66 %    % Lymphocytes 18 %    % Monocytes 15 %    % Eosinophils 0 %    % Basophils 1 %    % Immature Granulocytes 0 %    NRBCs per 100 WBC 0 <1 /100    Absolute Neutrophils 4.8 1.6 - 8.3 10e3/uL    Absolute Lymphocytes 1.3 0.8 - 5.3 10e3/uL    Absolute Monocytes 1.1 0.0 - 1.3 10e3/uL    Absolute Eosinophils 0.0 0.0 - 0.7 10e3/uL    Absolute Basophils 0.0 0.0 - 0.2 10e3/uL    Absolute Immature Granulocytes 0.0 <=0.4 10e3/uL    Absolute NRBCs 0.0 10e3/uL   Magnesium   Result Value Ref Range    Magnesium 1.4 (L) 1.7 - 2.3 mg/dL   Symptomatic Influenza A/B, RSV, & SARS-CoV2 PCR (COVID-19) Nose    Specimen: Nose; Swab   Result Value Ref Range    Influenza A PCR Negative Negative    Influenza B PCR Negative Negative    RSV PCR Negative Negative    SARS CoV2 PCR Negative Negative    Narrative    Testing was performed using the Xpert Xpress CoV2/Flu/RSV Assay on the [employer] GeneXpert Instrument. This test should be ordered for the detection of SARS-CoV-2, influenza, and RSV viruses in individuals who meet clinical and/or epidemiological criteria. Test performance is unknown in asymptomatic patients. This test is for in vitro diagnostic use under the FDA EUA for laboratories certified under CLIA to perform high or moderate complexity testing. This test has not been FDA cleared or approved. A negative result does not rule out the presence of PCR inhibitors in the specimen or target RNA in concentration below the limit of detection for the assay. If only one viral target is positive but coinfection with multiple targets is suspected, the sample should be re-tested with another FDA cleared, approved, or authorized test, if coinfection  would change clinical management. This test was validated by the [facility]. These laboratories are certified under the Clinical Laboratory Improvement Amendments of 1988 (CLIA-88) as qualified to perform high complexity laboratory testing.   UA with Microscopic reflex to Culture    Specimen: Urine, Clean Catch   Result Value Ref Range    Color Urine Yellow Colorless, Straw, Light Yellow, Yellow    Appearance Urine Clear Clear    Glucose Urine Negative Negative mg/dL    Bilirubin Urine Negative Negative    Ketones Urine 40 (A) Negative mg/dL    Specific Gravity Urine 1.018 1.000 - 1.030    Blood Urine Negative Negative    pH Urine 6.0 5.0 - 9.0    Protein Albumin Urine 10 (A) Negative mg/dL    Urobilinogen Urine 2.0 Normal, 2.0 mg/dL    Nitrite Urine Negative Negative    Leukocyte Esterase Urine Negative Negative    Mucus Urine Present (A) None Seen /LPF    RBC Urine <1 <=2 /HPF    WBC Urine 2 <=5 /HPF    Narrative    Urine Culture not indicated   XR Chest Port 1 View    Narrative    PROCEDURE INFORMATION:   Exam: XR Chest   Exam date and time: 9/2/2023 6:15 PM   Age: 68 years old   Clinical indication: Other: Fever     TECHNIQUE:   Imaging protocol: Radiologic exam of the chest.   Views: 1 view.     COMPARISON:   No relevant prior studies available.     FINDINGS:   Lungs: The lungs are well expanded, and demonstrate prominence of the pulmonary   vasculature. No focal consolidation.   Pleural spaces: No large pleural effusion. No pneumothorax.   Heart/Mediastinum: Cardiac silhouette is prominent in size and contour.   Bones/joints: Multilevel osseous degenerative changes.        Impression    IMPRESSION:   Prominence of the cardiac silhouette may reflect cardiomegaly and/or   pericardial effusion, with findings compatible with pulmonary vascular   congestion.  No consolidation.    THIS DOCUMENT HAS BEEN ELECTRONICALLY SIGNED BY CHRISTIANO CROSS MD       Medications   sodium chloride (PF) 0.9% PF flush  3 mL (has no administration in time range)   sodium chloride (PF) 0.9% PF flush 3 mL (3 mLs Intracatheter Not Given 9/2/23 1721)   vancomycin (VANCOCIN) 2,000 mg in sodium chloride 0.9 % 500 mL intermittent infusion (2,000 mg Intravenous $New Bag 9/2/23 1904)   diltiazem (CARDIZEM) 100 mg in 5% dextrose 100 mL  infusion (has no administration in time range)   ipratropium - albuterol 0.5 mg/2.5 mg/3 mL (DUONEB) neb solution 3 mL (has no administration in time range)   piperacillin-tazobactam (ZOSYN) 4.5 g vial to attach to  mL bag (0 g Intravenous Stopped 9/2/23 1853)   0.9% sodium chloride BOLUS (0 mLs Intravenous Stopped 9/2/23 1927)   diltiazem (CARDIZEM) injection 20 mg (20 mg Intravenous $Given 9/2/23 1730)   sodium chloride 0.9 % 1,000 mL with Infuvite Adult 10 mL, thiamine 100 mg, folic acid 1 mg infusion ( Intravenous $New Bag 9/2/23 1854)   LORazepam (ATIVAN) injection 2 mg (2 mg Intravenous $Given 9/2/23 1822)   magnesium sulfate 2 g in 50 mL sterile water intermittent infusion (2 g Intravenous $New Bag 9/2/23 1823)   diltiazem (CARDIZEM) injection 25 mg (25 mg Intravenous $Given 9/2/23 1845)       Assessments & Plan (with Medical Decision Making)     I have reviewed the nursing notes.    I have reviewed the findings, diagnosis, plan and need for follow up with the patient.  Critical Care time was 60 minutes for this patient excluding procedures.    I did speak with nocturnist, Dr. Luna, who is excepted the patient for admission to the intensive care unit.    New Prescriptions    No medications on file       Final diagnoses:   Atrial fibrillation with RVR (H)   Fever, unspecified fever cause   SIRS (systemic inflammatory response syndrome) (H)       9/2/2023   [facility]       Jovan Hodge MD  09/02/23 1907       Jovan Hodge MD  09/02/23 1929

## 2023-09-02 NOTE — ED TRIAGE NOTES
ED Nursing Triage Note (General)   ________________________________    Дмитрий Jessica is a 68 year old Male that presents to triage with complaints of SOB and tachycardia.  Patient states he was walking around when he became SOB and utilized a mobile cardiac monitor which showed a heart rate in the 180's.  Patient states incident occurred this afternoon. Patient denies headache, dizziness, nausea, or feeling light headed at this time. Patient does have a hx of afib and is on a blood thinner. Patient is also found to be febrile on arrival.  When asked about exposure, patients daughter states her  is sick with cold symptoms.   Significant symptoms had onset 24 hour(s) ago.      PRE HOSPITAL PRIOR LIVING SITUATION Spouse      Triage Assessment       Row Name 09/02/23 8224       Triage Assessment (Adult)    Airway WDL WDL       Respiratory WDL    Respiratory WDL WDL       Skin Circulation/Temperature WDL    Skin Circulation/Temperature WDL WDL       Cardiac WDL    Cardiac WDL X       Peripheral/Neurovascular WDL    Peripheral Neurovascular WDL WDL       Cognitive/Neuro/Behavioral WDL    Cognitive/Neuro/Behavioral WDL WDL

## 2023-09-02 NOTE — PHARMACY-VANCOMYCIN DOSING SERVICE
"Pharmacy Vancomycin Initial Note  Date of Service 2023  Patient's  1955  68 year old, male    Indication: Sepsis    Current estimated CrCl = CrCl cannot be calculated (No successful lab value found.).    Creatinine for last 3 days  No results found for requested labs within last 3 days.    Recent Vancomycin Level(s) for last 3 days  No results found for requested labs within last 3 days.      Vancomycin IV Administrations (past 72 hours)        No vancomycin orders with administrations in past 72 hours.                    Nephrotoxins and other renal medications (From now, onward)      Start     Dose/Rate Route Frequency Ordered Stop    23 1730  vancomycin (VANCOCIN) 2,000 mg in sodium chloride 0.9 % 500 mL intermittent infusion         2,000 mg  over 2 Hours Intravenous ONCE 23 1728      23 1720  piperacillin-tazobactam (ZOSYN) 4.5 g vial to attach to  mL bag        Note to Pharmacy: For [facility], [facility] and [facility]: For Zosyn-naive patients, use the \"Zosyn initial dose + extended infusion\" order panel.    4.5 g  over 30 Minutes Intravenous ONCE 23 1719              Contrast Orders - past 72 hours (72h ago, onward)      None             Plan:  Give vancomycin  2,000 mg IV x1 (ED loading dose).  Vancomycin monitoring method: AUC  Vancomycin therapeutic monitoring goal: 400-600 mg*h/L  Pharmacy will check vancomycin levels as appropriate in 1-3 Days.    Serum creatinine levels will be ordered daily for the first week of therapy and at least twice weekly for subsequent weeks.      Oseas Baum [facility]    "

## 2023-09-03 PROBLEM — E83.42 HYPOMAGNESEMIA: Status: ACTIVE | Noted: 2023-09-03

## 2023-09-03 PROBLEM — R53.1 GENERALIZED WEAKNESS: Status: ACTIVE | Noted: 2021-02-16

## 2023-09-03 PROBLEM — I25.10 CORONARY ATHEROSCLEROSIS: Status: ACTIVE | Noted: 2019-01-01

## 2023-09-03 LAB
ALBUMIN SERPL BCG-MCNC: 3.7 G/DL (ref 3.5–5.2)
ALP SERPL-CCNC: 75 U/L (ref 40–129)
ALT SERPL W P-5'-P-CCNC: 17 U/L (ref 0–70)
ANION GAP SERPL CALCULATED.3IONS-SCNC: 12 MMOL/L (ref 7–15)
AST SERPL W P-5'-P-CCNC: 24 U/L (ref 0–45)
BASOPHILS # BLD AUTO: 0 10E3/UL (ref 0–0.2)
BASOPHILS NFR BLD AUTO: 0 %
BILIRUB DIRECT SERPL-MCNC: 0.49 MG/DL (ref 0–0.3)
BILIRUB SERPL-MCNC: 1.6 MG/DL
BUN SERPL-MCNC: 11.2 MG/DL (ref 8–23)
CALCIUM SERPL-MCNC: 8 MG/DL (ref 8.8–10.2)
CHLORIDE SERPL-SCNC: 101 MMOL/L (ref 98–107)
CREAT SERPL-MCNC: 0.75 MG/DL (ref 0.67–1.17)
DEPRECATED HCO3 PLAS-SCNC: 23 MMOL/L (ref 22–29)
EOSINOPHIL # BLD AUTO: 0 10E3/UL (ref 0–0.7)
EOSINOPHIL NFR BLD AUTO: 0 %
ERYTHROCYTE [DISTWIDTH] IN BLOOD BY AUTOMATED COUNT: 14 % (ref 10–15)
GFR SERPL CREATININE-BSD FRML MDRD: >90 ML/MIN/1.73M2
GLUCOSE SERPL-MCNC: 137 MG/DL (ref 70–99)
HCT VFR BLD AUTO: 39.8 % (ref 40–53)
HGB BLD-MCNC: 13.9 G/DL (ref 13.3–17.7)
IMM GRANULOCYTES # BLD: 0 10E3/UL
IMM GRANULOCYTES NFR BLD: 0 %
L PNEUMO1 AG UR QL IA: NEGATIVE
LYMPHOCYTES # BLD AUTO: 1.3 10E3/UL (ref 0.8–5.3)
LYMPHOCYTES NFR BLD AUTO: 19 %
MAGNESIUM SERPL-MCNC: 1.7 MG/DL (ref 1.7–2.3)
MCH RBC QN AUTO: 32.3 PG (ref 26.5–33)
MCHC RBC AUTO-ENTMCNC: 34.9 G/DL (ref 31.5–36.5)
MCV RBC AUTO: 92 FL (ref 78–100)
MONOCYTES # BLD AUTO: 1 10E3/UL (ref 0–1.3)
MONOCYTES NFR BLD AUTO: 14 %
NEUTROPHILS # BLD AUTO: 4.4 10E3/UL (ref 1.6–8.3)
NEUTROPHILS NFR BLD AUTO: 67 %
NRBC # BLD AUTO: 0 10E3/UL
NRBC BLD AUTO-RTO: 0 /100
PHOSPHATE SERPL-MCNC: 3.8 MG/DL (ref 2.5–4.5)
PLATELET # BLD AUTO: 169 10E3/UL (ref 150–450)
POTASSIUM SERPL-SCNC: 3.5 MMOL/L (ref 3.4–5.3)
PROCALCITONIN SERPL IA-MCNC: 0.49 NG/ML
PROT SERPL-MCNC: 6.2 G/DL (ref 6.4–8.3)
RBC # BLD AUTO: 4.31 10E6/UL (ref 4.4–5.9)
S PNEUM AG SPEC QL: NEGATIVE
SODIUM SERPL-SCNC: 136 MMOL/L (ref 136–145)
WBC # BLD AUTO: 6.8 10E3/UL (ref 4–11)

## 2023-09-03 PROCEDURE — 250N000011 HC RX IP 250 OP 636: Mod: JZ | Performed by: INTERNAL MEDICINE

## 2023-09-03 PROCEDURE — 85004 AUTOMATED DIFF WBC COUNT: CPT | Performed by: INTERNAL MEDICINE

## 2023-09-03 PROCEDURE — 200N000001 HC R&B ICU

## 2023-09-03 PROCEDURE — 87899 AGENT NOS ASSAY W/OPTIC: CPT | Performed by: FAMILY MEDICINE

## 2023-09-03 PROCEDURE — 36415 COLL VENOUS BLD VENIPUNCTURE: CPT | Performed by: INTERNAL MEDICINE

## 2023-09-03 PROCEDURE — 250N000013 HC RX MED GY IP 250 OP 250 PS 637: Performed by: INTERNAL MEDICINE

## 2023-09-03 PROCEDURE — 258N000003 HC RX IP 258 OP 636: Performed by: INTERNAL MEDICINE

## 2023-09-03 PROCEDURE — 82310 ASSAY OF CALCIUM: CPT | Performed by: INTERNAL MEDICINE

## 2023-09-03 PROCEDURE — 83735 ASSAY OF MAGNESIUM: CPT | Performed by: INTERNAL MEDICINE

## 2023-09-03 PROCEDURE — 999N000157 HC STATISTIC RCP TIME EA 10 MIN

## 2023-09-03 PROCEDURE — 250N000013 HC RX MED GY IP 250 OP 250 PS 637: Performed by: FAMILY MEDICINE

## 2023-09-03 PROCEDURE — 99233 SBSQ HOSP IP/OBS HIGH 50: CPT | Performed by: FAMILY MEDICINE

## 2023-09-03 PROCEDURE — 84145 PROCALCITONIN (PCT): CPT | Performed by: FAMILY MEDICINE

## 2023-09-03 PROCEDURE — 82248 BILIRUBIN DIRECT: CPT | Performed by: INTERNAL MEDICINE

## 2023-09-03 PROCEDURE — 94640 AIRWAY INHALATION TREATMENT: CPT

## 2023-09-03 PROCEDURE — 84100 ASSAY OF PHOSPHORUS: CPT | Performed by: INTERNAL MEDICINE

## 2023-09-03 PROCEDURE — 250N000009 HC RX 250: Performed by: INTERNAL MEDICINE

## 2023-09-03 RX ORDER — METOPROLOL TARTRATE 25 MG/1
25 TABLET, FILM COATED ORAL 2 TIMES DAILY
Status: DISCONTINUED | OUTPATIENT
Start: 2023-09-03 | End: 2023-09-04

## 2023-09-03 RX ORDER — GLIPIZIDE 10 MG/1
1 TABLET ORAL DAILY PRN
Status: DISCONTINUED | OUTPATIENT
Start: 2023-09-03 | End: 2023-09-07 | Stop reason: HOSPADM

## 2023-09-03 RX ORDER — METOPROLOL TARTRATE 1 MG/ML
5 INJECTION, SOLUTION INTRAVENOUS EVERY 4 HOURS PRN
Status: DISCONTINUED | OUTPATIENT
Start: 2023-09-03 | End: 2023-09-07 | Stop reason: HOSPADM

## 2023-09-03 RX ADMIN — UMECLIDINIUM BROMIDE AND VILANTEROL TRIFENATATE 1 PUFF: 62.5; 25 POWDER RESPIRATORY (INHALATION) at 10:59

## 2023-09-03 RX ADMIN — ACETAMINOPHEN 650 MG: 325 TABLET, FILM COATED ORAL at 02:45

## 2023-09-03 RX ADMIN — APIXABAN 5 MG: 5 TABLET, FILM COATED ORAL at 09:48

## 2023-09-03 RX ADMIN — DEXTROSE MONOHYDRATE 15 MG/HR: 50 INJECTION, SOLUTION INTRAVENOUS at 21:21

## 2023-09-03 RX ADMIN — ONDANSETRON 4 MG: 2 INJECTION INTRAMUSCULAR; INTRAVENOUS at 18:10

## 2023-09-03 RX ADMIN — ATORVASTATIN CALCIUM 80 MG: 40 TABLET, FILM COATED ORAL at 09:47

## 2023-09-03 RX ADMIN — MULTIPLE VITAMINS W/ MINERALS TAB 1 TABLET: TAB at 09:47

## 2023-09-03 RX ADMIN — FUROSEMIDE 40 MG: 10 INJECTION, SOLUTION INTRAMUSCULAR; INTRAVENOUS at 09:47

## 2023-09-03 RX ADMIN — ONDANSETRON 4 MG: 2 INJECTION INTRAMUSCULAR; INTRAVENOUS at 11:18

## 2023-09-03 RX ADMIN — FUROSEMIDE 40 MG: 10 INJECTION, SOLUTION INTRAMUSCULAR; INTRAVENOUS at 21:21

## 2023-09-03 RX ADMIN — METOPROLOL TARTRATE 25 MG: 25 TABLET, FILM COATED ORAL at 21:21

## 2023-09-03 RX ADMIN — THIAMINE HCL TAB 100 MG 100 MG: 100 TAB at 09:47

## 2023-09-03 RX ADMIN — METOPROLOL TARTRATE 25 MG: 25 TABLET, FILM COATED ORAL at 09:48

## 2023-09-03 RX ADMIN — APIXABAN 5 MG: 5 TABLET, FILM COATED ORAL at 21:21

## 2023-09-03 RX ADMIN — DEXTROSE MONOHYDRATE 15 MG/HR: 50 INJECTION, SOLUTION INTRAVENOUS at 07:45

## 2023-09-03 RX ADMIN — FOLIC ACID 1 MG: 1 TABLET ORAL at 09:48

## 2023-09-03 RX ADMIN — MELATONIN 6 MG: 3 TAB ORAL at 02:45

## 2023-09-03 RX ADMIN — DEXTROSE MONOHYDRATE 15 MG/HR: 50 INJECTION, SOLUTION INTRAVENOUS at 02:46

## 2023-09-03 RX ADMIN — DEXTROSE MONOHYDRATE 15 MG/HR: 50 INJECTION, SOLUTION INTRAVENOUS at 14:29

## 2023-09-03 RX ADMIN — PANTOPRAZOLE SODIUM 40 MG: 40 TABLET, DELAYED RELEASE ORAL at 07:28

## 2023-09-03 RX ADMIN — MELATONIN 6 MG: 3 TAB ORAL at 21:21

## 2023-09-03 ASSESSMENT — ACTIVITIES OF DAILY LIVING (ADL)
ADLS_ACUITY_SCORE: 24
ADLS_ACUITY_SCORE: 22
ADLS_ACUITY_SCORE: 24

## 2023-09-03 NOTE — ED NOTES
RT in room diltiazem started pt notified of transfer warm blanket given and brief placed due to urinary incontinent

## 2023-09-03 NOTE — PROGRESS NOTES
" NS ADMISSION NOTE    Patient admitted to room 914 at approximately 2015 via bed from emergency room. Patient was accompanied by nurse.     Verbal SBAR report received from Angela TITUS prior to patient arrival.     Patient trasferred to bed via slide Patient alert and oriented X 4. The patient is not having any pain.  . Admission vital signs: Blood pressure 123/87, pulse (!) 138, temperature (!) 101.1  F (38.4  C), temperature source Tympanic, resp. rate 12, height 1.803 m (5' 11\"), weight 104.3 kg (230 lb), SpO2 96 %. Patient was oriented to plan of care, call light, bed controls, tv, telephone, bathroom, and visiting hours.     Risk Assessment    The following safety risks were identified during admission: fall. Yellow risk band applied: YES.     Skin Initial Assessment    This writer admitted this patient and completed a full skin assessment and Alexandre score in the Adult PCS flowsheet. Appropriate interventions initiated as needed.        Corinna Pollard RN      "

## 2023-09-03 NOTE — PROGRESS NOTES
[facility]    Medicine Progress Note - Hospitalist Service    Date of Admission:  9/2/2023    68-year-old male with history of A-fib status post ablation presenting for A-fib with RVR and symptoms of shortness of breath and pulse of 180.  Admitted and placed in ICU on a diltiazem drip.    Assessment & Plan   Principal Problem:    Atrial fibrillation with RVR (H)  History of RVR.  January 2020 and February 2021 required cardioversion despite high BB and CCB dosing.    Sotalol utilized in 2021, discontinued due to prolonged QT.  Amiodarone provided, but discontinued due to shortness of breath.  Cardiac ablation performed 5/14/2021 at [facility].  Taken off diltiazem and metoprolol.  Continues with Xarelto for anticoagulation.  Initial pulse in the 180s.  Did not respond to initial diltiazem bolus.  Currently on diltiazem at 15 mg an hour.  Despite this rate is elevated over 100.    Started on metoprolol 25 mg twice daily.  He took both diltiazem and metoprolol in the past.  Avoid amiodarone due to past side effects.  Consider digoxin if not improving.  He may need an EP consult.  Continue in ICU while on diltiazem drip.      Coronary atherosclerosis  Mild nonobstructive coronary artery disease involving the left main and left anterior descending artery seen on CTA at [facility] April 2021  Continue statin    SOB  History of heart failure associated with A-fib with RVR. Not currently treated for HF on admit.  Last echocardiogram May 2021 with normal EF 60-65%  Plan repeat ECHO in 2 days when available  Currently on 1 L O2 placed on admit.    Hypertension   Holding home losartan due to institution of diltiazem and metoprolol.      Alcohol withdrawal syndrome without complication (H)  Last drink 1 day prior to admission.  On alcohol withdrawal protocol  Elevated bilirubin due to chronic alcohol use      COPD with asthma (H)  Placed on an Anoro Ellipta substituted for home Stiolto (LAMA/LABA  "combo)      SIRS (systemic inflammatory response syndrome) (H)    Fever, unspecified fever cause  Unclear etiology. Likely respiratory given cough and friend with URI symptoms.  No infiltrate on chest x-ray.  Negative UA.    Received 1 dose of Zosyn and vancomycin in the ED.    Procalcitonin 0.47.  Stable on repeat next morning.  Now afebrile.  Monitor      Hypomagnesemia  Admit level 1.4. Improved with replacement.      Generalized weakness  A-fib vs illness. Consider PT and OT once HR improves    Reviewed outside records from [facility]  Spent over 52 minutes on care of patient     Diet: Combination Diet Low Saturated Fat Na <2400mg Diet, No Caffeine Diet    DVT Prophylaxis: DOAC  Sánchez Catheter: Not present  Lines: None     Cardiac Monitoring: ACTIVE order. Indication: Tachyarrhythmias, acute (48 hours)  Code Status: Full Code      Clinically Significant Risk Factors Present on Admission          # Hypocalcemia: Lowest Ca = 8 mg/dL in last 2 days, will monitor and replace as appropriate   # Hypomagnesemia: Lowest Mg = 1.4 mg/dL in last 2 days, will replace as needed    # Drug Induced Coagulation Defect: home medication list includes an anticoagulant medication    # Hypertension: Noted on problem list      # Obesity: Estimated body mass index is 32.08 kg/m  as calculated from the following:    Height as of this encounter: 1.803 m (5' 11\").    Weight as of this encounter: 104.3 kg (230 lb).              Disposition Plan      Expected Discharge Date: 09/04/2023                  Evan Matson MD  Hospitalist Service  [facility]  Securely message with [email or web address] (more info)  Text page via [facility] Paging/Directory   ______________________________________________________________________    Interval History   Reports feeling a little better overnight.  Admitted with cough, fever, shortness of breath.  Shortness of breath has improved.  Afebrile since admission.  Remains on diltiazem at 15.  Denies " pain.    Physical Exam   Vital Signs: Temp: 98.6  F (37  C) Temp src: Tympanic BP: 119/80 Pulse: 97   Resp: (!) 33 SpO2: 96 % O2 Device: Nasal cannula Oxygen Delivery: 1 LPM  Weight: 230 lbs 0 oz    General Appearance: Alert. No acute distress  Chest/Respiratory Exam: Clear to auscultation bilaterally  Cardiovascular Exam: Tachycardic.  No murmurs gallops rubs  Gastrointestinal Exam: Soft, nontender, no abnormal masses  Neurological: No tremor. Weak, not able to sit up in bed.  Extremities: 2+ pedal pulses.  No lower extremity edema.  Psychiatric: Normal affect, does not have much to add with history, unclear baseline cognitive status      Medical Decision Making             Data     I have personally reviewed the following data over the past 24 hrs:    6.8  \   13.9   / 169     136 101 11.2 /  137 (H)   3.5 23 0.75 \     ALT: 17 AST: 24 AP: 75 TBILI: 1.6 (H)   ALB: 3.7 TOT PROTEIN: 6.2 (L) LIPASE: N/A     Procal: 0.49 (H) CRP: N/A Lactic Acid: 1.3         Imaging results reviewed over the past 24 hrs:   Recent Results (from the past 24 hour(s))   XR Chest Port 1 View    Narrative    PROCEDURE INFORMATION:   Exam: XR Chest   Exam date and time: 9/2/2023 6:15 PM   Age: 68 years old   Clinical indication: Other: Fever     TECHNIQUE:   Imaging protocol: Radiologic exam of the chest.   Views: 1 view.     COMPARISON:   No relevant prior studies available.     FINDINGS:   Lungs: The lungs are well expanded, and demonstrate prominence of the pulmonary   vasculature. No focal consolidation.   Pleural spaces: No large pleural effusion. No pneumothorax.   Heart/Mediastinum: Cardiac silhouette is prominent in size and contour.   Bones/joints: Multilevel osseous degenerative changes.        Impression    IMPRESSION:   Prominence of the cardiac silhouette may reflect cardiomegaly and/or   pericardial effusion, with findings compatible with pulmonary vascular   congestion.  No consolidation.    THIS DOCUMENT HAS BEEN  ELECTRONICALLY SIGNED BY CHRISTIANO CROSS MD

## 2023-09-03 NOTE — ED NOTES
MD notified of need to place pt on 2L nC due to increased SOB, listened to LS no crackles just wheezing upper lobes which pt states is fairly normal for him with his COPD

## 2023-09-03 NOTE — PHARMACY-ADMISSION MEDICATION HISTORY
Pharmacist Admission Medication History    Admission medication history is complete. The information provided in this note is only as accurate as the sources available at the time of the update.    Medication reconciliation/reorder completed by provider prior to medication history? Yes    Information Source(s): Patient via in-person interview  -Jillian        Changes made to PTA medication list:  Added:   Stiolto respimat  Systane balance eye drops  Deleted:   Naltrexone  Olodaterol inhaler (Striverdi)  FML eye drops  Changed: None      Medication Affordability: no concerns noted       Allergies reviewed with patient and updates made in EHR: yes- changes as noted below:  -Cephalexin- updated to HIGH severity (pt reports anaphylaxis)  -Sotalol- updated to MEDIUM severity (QT prolongation)  -Rivaroxaban- updated to LOW severity (hair loss, per pt)  -Codeine- updated to LOW severity (deleted swelling, added hives, per pt)      Medication History Completed By: Oseas Baum [facility] 9/3/2023 10:27 AM    Prior to Admission medications    Medication Sig Last Dose Taking? Auth Provider Long Term End Date   albuterol (PROAIR HFA/PROVENTIL HFA/VENTOLIN HFA) 108 (90 Base) MCG/ACT inhaler Inhale 2 puffs into the lungs every 4 hours as needed 9/2/2023 at 1600 Yes Reported, Patient Yes    apixaban ANTICOAGULANT (ELIQUIS) 5 MG tablet Take 5 mg by mouth 2 times daily 9/2/2023 at AM Yes Reported, Patient     Cholecalciferol (D 1000) 25 MCG (1000 UT) CAPS Take 1,000 Units by mouth daily 9/2/2023 at AM Yes Reported, Patient     losartan (COZAAR) 50 MG tablet Take 50 mg by mouth 2 times daily 9/2/2023 at AM Yes Reported, Patient Yes    pantoprazole (PROTONIX) 40 MG EC tablet Take 40 mg by mouth daily 9/2/2023 at AM Yes Reported, Patient     propylene glycol (SYSTANE BALANCE) 0.6 % SOLN ophthalmic solution Place 1 drop into both eyes daily as needed for dry eyes Past Week Yes Unknown, Entered By History     rosuvastatin (CRESTOR) 20 MG  tablet Take 20 mg by mouth daily 9/1/2023 at PM Yes Reported, Patient Yes    thiamine (B-1) 100 MG tablet Take 100 mg by mouth daily 9/2/2023 at AM Yes Reported, Patient     tiotropium-olodaterol 2.5-2.5 MCG/ACT AERS Inhale 2 puffs into the lungs daily 9/2/2023 at AM Yes Unknown, Entered By History

## 2023-09-03 NOTE — H&P
HOSPITALIST TELEMED ADMISSION H&P    Service Date : 9/2/2023  Dr. Jeremy BERRIOS, am located in [place].   Димтрий Jessica is located in [place] at [facility].   The RN or tech on duty in the ED is assisting me today with this patient.    chief complaint : SOB    History of Present Illness:  Pt is a 69 yo m with h/o alcohol abuse, afib on [facility], refer for  admission for AF RVR and fever. HE c/o SOB that started yesterday, constant.  He has been drinking heavily for a few days although nothing in the past 24 hours. Friend had a cardiac monitoring device and they checked it today and his HR was up to 190s. Denies palpitations, CP or tightness.  No known fevers at home, but he was febrilee in ER to 101.9. Denies URI sx, cough, n/v/d/abd pain, no urinary problems. Son in law has a cold, no other known sick contact.     In ER, pt was febrile to 101.9 and tachy to 170. BP was stable, satting 93% on RA. BMP angel, Mg low at 1.4. LFTs with bili 2.2 but otherwise normal.  CBC was normal.  UA showed no evidence of UTI.  Flu and COVID were negative.  Chest x-ray showed prominence of cardiac silhouette which may reflect cardiomegaly and/or pericardial effusion with pulmonary vascular congestion.  No consolidation.  In the ER, he was given sepsis IV fluid bolus 3 L. he was given Cardizem IV push x2 and then placed on a Cardizem drip.  That is currently maxed at 15 in his heart rate is still in the 140s.  He was also given Ativan, DuoNeb, 2 g of Mag, Zosyn, vancomycin, and was referred for admission.    Past Medical History  Patient Active Problem List   Diagnosis    Alcohol abuse    Alcohol withdrawal syndrome without complication (H)    Aortic root aneurysm (H)    Atrial fibrillation with rapid ventricular response (H)    Galeas's esophagus    Bell's palsy    Compression fracture of T12 vertebra (H)    COPD with asthma (H)    Atherosclerosis of aorta (H)    Dyslipidemia    HTN, goal below 130/80    IFG (impaired fasting  "glucose)    Obesity (BMI 30-39.9)    Prostate cancer (H)    SIRS (systemic inflammatory response syndrome) (H)    Atrial fibrillation with RVR (H)    Fever, unspecified fever cause         Past Surgical History  Afib ablation 2 years ago  Surgery for prostate cancer    Social History  Ex smoker, drinks 4-5 drinks daily, denies h/o withdrawal    Family History  denies    Home Medications  Current Facility-Administered Medications   Medication    acetaminophen (TYLENOL) tablet 650 mg    amiodarone (NEXTERONE) 1.8 mg/mL in dextrose 5% 200 mL ADULT STANDARD infusion    [START ON 9/3/2023] amiodarone (NEXTERONE) 1.8 mg/mL in dextrose 5% 200 mL ADULT STANDARD infusion    amiodarone (NEXTERONE) bolus 150 mg    diltiazem (CARDIZEM) 100 mg in 5% dextrose 100 mL  infusion    ondansetron (ZOFRAN ODT) ODT tab 4 mg    Or    ondansetron (ZOFRAN) injection 4 mg    sodium chloride (PF) 0.9% PF flush 3 mL    sodium chloride (PF) 0.9% PF flush 3 mL       Allergies  Allergies   Allergen Reactions    Bacitracin Swelling, Other (See Comments) and Unknown     throat swelled up    Cephalexin Other (See Comments)    Codeine Nausea and Swelling    Rivaroxaban Other (See Comments)    Sotalol Other (See Comments)     Qt prolongation in February 2021        10 pt ROS neg except as noted in HPI    Physical Exam:  I performed all aspects of the physical examination via Telemedicine associated with two way audio and video communication.    Vital Signs: Blood pressure 118/85, pulse (!) 131, temperature (!) 101.1  F (38.4  C), temperature source Tympanic, resp. rate 19, height 1.803 m (5' 11\"), weight 104.3 kg (230 lb), SpO2 94 %.    Physical Exam    Gen:  Well-developed, well-nourished, in no acute distress, lying semi-supine in hospital stretcher  HEENT:  Anicteric sclera, PER, hearing intact to voice  Resp:  No accessory muscle use, breath sounds clear; no wheezes no rales no rhonchi  Card:  No murmur, normal S1, S2   Abd:  Soft per RN exam, no " TTP, non-distended, normoactive bowel sounds are present  Musc:  Normal strength and movement of the major muscle groups without obvious deformity  Psych:  Good insight, oriented to person, place and time, not anxious, not agitated    DATA  Labs:  I have personally reviewed the following studies:  Recent Labs   Lab 09/02/23  1720   POTASSIUM 4.0   CHLORIDE 95*   CO2 23   BUN 16.0   ALBUMIN 3.9   ALKPHOS 84   AST 32   ALT 21      Recent Labs   Lab 09/02/23  1720   WBC 7.2   HGB 15.2   HCT 43.6          Imaging: ER imaging reviewed    ASSESSMENT:  Principal Problem:    SIRS (systemic inflammatory response syndrome) (H)  Active Problems:    Atrial fibrillation with RVR (H)    Fever, unspecified fever cause    PLAN:       1. AFib RVR :  Admit to ICU.  Will give a dose of IV metoprolol.  If that does not work will start an amio drip.  Check echo in a.m..  Monitor on telemetry and continuous pulse ox.  Mag repleted. Continue Eliquis    2. Shortness of breath, suspect CHF:  Will start on Lasix.  He got 3 L of IV fluid in the ER...  Check echo in a.m..    3. Hypertension: Hold Cozaar to make room for rate control for the AFib.    4. Hyperlipidemia: Continue statin    5. COPD: Continue inhalers, does not appear to have any acute exacerbation currently    6. Alcohol abuse:  Place on alcohol withdrawal CIWA protocol.  Repeat LFTs in a.m. as well., given the elevated bilirubin    7. Hypomagnesemia: Recheck in a.m.    8. Fever: Unclear etiology.  No obvious source, no evidence of bacterial infection.  Hold off on any further antibiotics since there is no obvious bacterial infection.  Follow up blood cultures.  Will get a procalcitonin level.    Misc  DVT prophylaxis: on eliquis  Code Status: Full code     The plan was discussed with - Patient

## 2023-09-04 LAB
ANION GAP SERPL CALCULATED.3IONS-SCNC: 9 MMOL/L (ref 7–15)
BUN SERPL-MCNC: 14 MG/DL (ref 8–23)
CALCIUM SERPL-MCNC: 8.7 MG/DL (ref 8.8–10.2)
CHLORIDE SERPL-SCNC: 95 MMOL/L (ref 98–107)
CREAT SERPL-MCNC: 0.78 MG/DL (ref 0.67–1.17)
DEPRECATED HCO3 PLAS-SCNC: 29 MMOL/L (ref 22–29)
ERYTHROCYTE [DISTWIDTH] IN BLOOD BY AUTOMATED COUNT: 14 % (ref 10–15)
GFR SERPL CREATININE-BSD FRML MDRD: >90 ML/MIN/1.73M2
GLUCOSE SERPL-MCNC: 137 MG/DL (ref 70–99)
HCT VFR BLD AUTO: 40.5 % (ref 40–53)
HGB BLD-MCNC: 14.2 G/DL (ref 13.3–17.7)
MCH RBC QN AUTO: 32.3 PG (ref 26.5–33)
MCHC RBC AUTO-ENTMCNC: 35.1 G/DL (ref 31.5–36.5)
MCV RBC AUTO: 92 FL (ref 78–100)
PLATELET # BLD AUTO: 173 10E3/UL (ref 150–450)
POTASSIUM SERPL-SCNC: 3.4 MMOL/L (ref 3.4–5.3)
PROCALCITONIN SERPL IA-MCNC: 0.43 NG/ML
RBC # BLD AUTO: 4.39 10E6/UL (ref 4.4–5.9)
SODIUM SERPL-SCNC: 133 MMOL/L (ref 136–145)
WBC # BLD AUTO: 6.8 10E3/UL (ref 4–11)

## 2023-09-04 PROCEDURE — 250N000013 HC RX MED GY IP 250 OP 250 PS 637: Performed by: INTERNAL MEDICINE

## 2023-09-04 PROCEDURE — 36415 COLL VENOUS BLD VENIPUNCTURE: CPT | Performed by: FAMILY MEDICINE

## 2023-09-04 PROCEDURE — 258N000003 HC RX IP 258 OP 636: Performed by: INTERNAL MEDICINE

## 2023-09-04 PROCEDURE — 250N000011 HC RX IP 250 OP 636: Mod: JZ | Performed by: INTERNAL MEDICINE

## 2023-09-04 PROCEDURE — 200N000001 HC R&B ICU

## 2023-09-04 PROCEDURE — 94640 AIRWAY INHALATION TREATMENT: CPT | Mod: 76

## 2023-09-04 PROCEDURE — 250N000009 HC RX 250: Performed by: INTERNAL MEDICINE

## 2023-09-04 PROCEDURE — 85027 COMPLETE CBC AUTOMATED: CPT | Performed by: FAMILY MEDICINE

## 2023-09-04 PROCEDURE — 250N000013 HC RX MED GY IP 250 OP 250 PS 637: Performed by: FAMILY MEDICINE

## 2023-09-04 PROCEDURE — 999N000157 HC STATISTIC RCP TIME EA 10 MIN

## 2023-09-04 PROCEDURE — 84145 PROCALCITONIN (PCT): CPT | Performed by: FAMILY MEDICINE

## 2023-09-04 PROCEDURE — 82435 ASSAY OF BLOOD CHLORIDE: CPT | Performed by: FAMILY MEDICINE

## 2023-09-04 PROCEDURE — 99232 SBSQ HOSP IP/OBS MODERATE 35: CPT | Performed by: FAMILY MEDICINE

## 2023-09-04 RX ORDER — METOPROLOL TARTRATE 50 MG
50 TABLET ORAL 2 TIMES DAILY
Status: DISCONTINUED | OUTPATIENT
Start: 2023-09-04 | End: 2023-09-07

## 2023-09-04 RX ADMIN — DEXTROSE MONOHYDRATE 15 MG/HR: 50 INJECTION, SOLUTION INTRAVENOUS at 03:09

## 2023-09-04 RX ADMIN — METOPROLOL TARTRATE 50 MG: 50 TABLET, FILM COATED ORAL at 09:23

## 2023-09-04 RX ADMIN — THIAMINE HCL TAB 100 MG 100 MG: 100 TAB at 09:22

## 2023-09-04 RX ADMIN — FUROSEMIDE 40 MG: 10 INJECTION, SOLUTION INTRAMUSCULAR; INTRAVENOUS at 22:17

## 2023-09-04 RX ADMIN — METOPROLOL TARTRATE 50 MG: 50 TABLET, FILM COATED ORAL at 22:17

## 2023-09-04 RX ADMIN — PANTOPRAZOLE SODIUM 40 MG: 40 TABLET, DELAYED RELEASE ORAL at 07:31

## 2023-09-04 RX ADMIN — FUROSEMIDE 40 MG: 10 INJECTION, SOLUTION INTRAMUSCULAR; INTRAVENOUS at 09:22

## 2023-09-04 RX ADMIN — UMECLIDINIUM BROMIDE AND VILANTEROL TRIFENATATE 1 PUFF: 62.5; 25 POWDER RESPIRATORY (INHALATION) at 07:23

## 2023-09-04 RX ADMIN — APIXABAN 5 MG: 5 TABLET, FILM COATED ORAL at 09:22

## 2023-09-04 RX ADMIN — MULTIPLE VITAMINS W/ MINERALS TAB 1 TABLET: TAB at 09:22

## 2023-09-04 RX ADMIN — APIXABAN 5 MG: 5 TABLET, FILM COATED ORAL at 22:17

## 2023-09-04 RX ADMIN — ATORVASTATIN CALCIUM 80 MG: 40 TABLET, FILM COATED ORAL at 09:22

## 2023-09-04 RX ADMIN — DEXTROSE MONOHYDRATE 15 MG/HR: 50 INJECTION, SOLUTION INTRAVENOUS at 16:51

## 2023-09-04 RX ADMIN — FOLIC ACID 1 MG: 1 TABLET ORAL at 09:22

## 2023-09-04 RX ADMIN — DEXTROSE MONOHYDRATE 15 MG/HR: 50 INJECTION, SOLUTION INTRAVENOUS at 10:08

## 2023-09-04 ASSESSMENT — ACTIVITIES OF DAILY LIVING (ADL)
ADLS_ACUITY_SCORE: 24
ADLS_ACUITY_SCORE: 25
ADLS_ACUITY_SCORE: 24
ADLS_ACUITY_SCORE: 25
ADLS_ACUITY_SCORE: 24

## 2023-09-04 NOTE — PROGRESS NOTES
Shift Summary    Pt is currently on 1 LPM with Sp02 94-96%. 02 titrated down from 2 LPM from start of shift. Weaning off 02 attempted, however patient desaturated with movement and kept on 1 LPM. Anoro Ellipta given during shift. No further interventions at this time.    Priya Godoy, RT

## 2023-09-04 NOTE — PROGRESS NOTES
[facility]    Medicine Progress Note - Hospitalist Service    Date of Admission:  9/2/2023    68-year-old male with history of A-fib status post ablation presenting for A-fib with RVR and symptoms of shortness of breath and pulse of 180.  Admitted and placed in ICU on a diltiazem drip. Rate slightly improved, continue in ICU.    Assessment & Plan   Principal Problem:    Atrial fibrillation with RVR (H)  History of RVR.  January 2020 and February 2021 required cardioversion despite high BB and CCB dosing.    Sotalol utilized in 2021, discontinued due to prolonged QT.  Amiodarone provided, but discontinued due to shortness of breath.  Cardiac ablation performed 5/14/2021 at [facility].  Taken off diltiazem and metoprolol.  Continues with Xarelto for anticoagulation.  Initial pulse in the 180s.  Did not respond to initial diltiazem bolus.  Currently on diltiazem at 15 mg an hour.    Despite max diltiazem pulse elevated over 100, so started on metoprolol. Now in 80 to 90s.   Increase metoprolol to 50 mg twice daily.  He took both diltiazem and metoprolol in the past.  Avoid amiodarone due to past side effects.  Consider digoxin if not improving.  He may need an EP consult tomorrow.  Continue in ICU while on diltiazem drip.      Coronary atherosclerosis  Mild nonobstructive coronary artery disease involving the left main and left anterior descending artery seen on CTA at [facility] April 2021  Continue statin    SOB  History of heart failure associated with A-fib with RVR. Not currently treated for HF on admit.  Last echocardiogram May 2021 with normal EF 60-65%  Plan repeat ECHO tomorrow when available  Currently on 1 L O2 placed on admit.    Hypertension   Holding home losartan due to institution of diltiazem and metoprolol. Blood pressure 105-110.      Alcohol withdrawal syndrome without complication (H)  Last drink 1 day prior to admission.  On alcohol withdrawal protocol - scoring 0  Elevated  "bilirubin due to chronic alcohol use      COPD with asthma (H)  Placed on an Anoro Ellipta substituted for home Stiolto (LAMA/LABA combo)      SIRS (systemic inflammatory response syndrome) (H)    Fever, unspecified fever cause  Unclear etiology. Likely respiratory given cough and friend with URI symptoms.  No infiltrate on chest x-ray.  Negative UA.    Received 1 dose of Zosyn and vancomycin in the ED.    Procalcitonin 0.47.  Stable on repeat next 2 mornings.  Remains afebrile.  Monitor  Negative urine Legionella and strep antigens      Hypomagnesemia  Admit level 1.4. Improved with replacement.      Generalized weakness  A-fib vs illness. Consider PT and OT once HR improves       Diet: Combination Diet Low Saturated Fat Na <2400mg Diet, No Caffeine Diet    DVT Prophylaxis: DOAC  Sánchez Catheter: Not present  Lines: None     Cardiac Monitoring: ACTIVE order. Indication: Tachyarrhythmias, acute (48 hours)  Code Status: Full Code      Clinically Significant Risk Factors          # Hypocalcemia: Lowest Ca = 8 mg/dL in last 2 days, will monitor and replace as appropriate   # Hypomagnesemia: Lowest Mg = 1.4 mg/dL in last 2 days, will replace as needed           # Hypertension: Noted on problem list        # Obesity: Estimated body mass index is 31.12 kg/m  as calculated from the following:    Height as of this encounter: 1.803 m (5' 11\").    Weight as of this encounter: 101.2 kg (223 lb 1.7 oz).  , PRESENT ON ADMISSION            Disposition Plan     Expected Discharge Date: 09/04/2023                  Evan Matson MD  Hospitalist Service  [facility]  Securely message with [employer] (more info)  Text page via [facility] Paging/Directory   ______________________________________________________________________    Interval History   No chest pain or shortness of breath.  Overall stable.  Afebrile since admission.  Remains on diltiazem at 15.  Denies any other concerns.    Physical Exam   Vital Signs: " Temp: 98.4  F (36.9  C) Temp src: Tympanic BP: 118/84 Pulse: 79   Resp: 23 SpO2: 93 % O2 Device: Nasal cannula Oxygen Delivery: 1 LPM  Weight: 223 lbs 1.69 oz    General Appearance: Alert. No acute distress  Chest/Respiratory Exam: Clear to auscultation bilaterally  Cardiovascular Exam: Tachycardic.  No murmurs gallops rubs  Gastrointestinal Exam: Soft, nontender, no abnormal masses  Neurological: No tremor. Weak, not able to sit up in bed.  Extremities: No lower extremity edema.  Psychiatric: Normal affect, does not have much to add with history, unclear baseline cognitive status      Medical Decision Making             Data     I have personally reviewed the following data over the past 24 hrs:    6.8  \   14.2   / 173     133 (L) 95 (L) 14.0 /  137 (H)   3.4 29 0.78 \     Procal: 0.43 (H) CRP: N/A Lactic Acid: N/A         Imaging results reviewed over the past 24 hrs:   No results found for this or any previous visit (from the past 24 hour(s)).

## 2023-09-04 NOTE — PLAN OF CARE
"  Problem: Plan of Care - These are the overarching goals to be used throughout the patient stay.    Goal: Plan of Care Review  Description: The Plan of Care Review/Shift note should be completed every shift.  The Outcome Evaluation is a brief statement about your assessment that the patient is improving, declining, or no change.  This information will be displayed automatically on your shift note.  Outcome: Progressing  Goal: Patient-Specific Goal (Individualized)  Description: You can add care plan individualizations to a care plan. Examples of Individualization might be:  \"Parent requests to be called daily at 9am for status\", \"I have a hard time hearing out of my right ear\", or \"Do not touch me to wake me up as it startles me\".  Outcome: Progressing  Goal: Absence of Hospital-Acquired Illness or Injury  Outcome: Progressing  Intervention: Identify and Manage Fall Risk  Recent Flowsheet Documentation  Taken 9/4/2023 0400 by Viky Evans, RN  Safety Promotion/Fall Prevention:   clutter free environment maintained   nonskid shoes/slippers when out of bed   patient and family education   room near nurse's station   room organization consistent   safety round/check completed   supervised activity  Taken 9/4/2023 0100 by Viky Evans, RN  Safety Promotion/Fall Prevention:   clutter free environment maintained   nonskid shoes/slippers when out of bed   patient and family education   room near nurse's station   room organization consistent   safety round/check completed   supervised activity  Taken 9/3/2023 1900 by Viky Evans, RN  Safety Promotion/Fall Prevention:   clutter free environment maintained   nonskid shoes/slippers when out of bed   patient and family education   room near nurse's station   room organization consistent   safety round/check completed   supervised activity  Intervention: Prevent Skin Injury  Recent Flowsheet Documentation  Taken 9/4/2023 0400 by Viky Evans, RN  Body " Position: weight shifting  Taken 9/4/2023 0100 by Viky Evans, RN  Body Position: weight shifting  Taken 9/3/2023 1900 by Viky Evans RN  Body Position: position changed independently  Intervention: Prevent and Manage VTE (Venous Thromboembolism) Risk  Recent Flowsheet Documentation  Taken 9/4/2023 0400 by Viky Evans RN  VTE Prevention/Management: (eliquis) other (see comments)  Taken 9/4/2023 0100 by Viky Evans RN  VTE Prevention/Management: (eliquis) other (see comments)  Taken 9/3/2023 1900 by Viky Evans RN  VTE Prevention/Management: (eliquis) other (see comments)  Goal: Optimal Comfort and Wellbeing  Outcome: Progressing  Intervention: Provide Person-Centered Care  Recent Flowsheet Documentation  Taken 9/4/2023 0400 by Viky Evans RN  Trust Relationship/Rapport:   care explained   questions encouraged   reassurance provided  Taken 9/4/2023 0100 by Viky Evans, TACHO  Trust Relationship/Rapport:   care explained   questions encouraged   reassurance provided  Taken 9/3/2023 1928 by Viky Evans, RN  Trust Relationship/Rapport:   care explained   questions encouraged   reassurance provided  Goal: Readiness for Transition of Care  Outcome: Progressing   Goal Outcome Evaluation:    Rested comfortably this shift. Offered no complaints. No fevers. Cardizem gtt at 15mg/hr. Able to make needs known. Call light within reach .

## 2023-09-05 ENCOUNTER — APPOINTMENT (OUTPATIENT)
Dept: CARDIOLOGY | Facility: OTHER | Age: 68
DRG: 309 | End: 2023-09-05
Attending: INTERNAL MEDICINE
Payer: COMMERCIAL

## 2023-09-05 PROBLEM — J96.01 ACUTE RESPIRATORY FAILURE WITH HYPOXIA (H): Status: ACTIVE | Noted: 2023-09-05

## 2023-09-05 LAB
ANION GAP SERPL CALCULATED.3IONS-SCNC: 10 MMOL/L (ref 7–15)
ATRIAL RATE - MUSE: 71 BPM
BUN SERPL-MCNC: 19.1 MG/DL (ref 8–23)
CALCIUM SERPL-MCNC: 8.9 MG/DL (ref 8.8–10.2)
CHLORIDE SERPL-SCNC: 91 MMOL/L (ref 98–107)
CREAT SERPL-MCNC: 0.83 MG/DL (ref 0.67–1.17)
DEPRECATED HCO3 PLAS-SCNC: 32 MMOL/L (ref 22–29)
DIASTOLIC BLOOD PRESSURE - MUSE: NORMAL MMHG
ERYTHROCYTE [DISTWIDTH] IN BLOOD BY AUTOMATED COUNT: 14 % (ref 10–15)
GFR SERPL CREATININE-BSD FRML MDRD: >90 ML/MIN/1.73M2
GLUCOSE SERPL-MCNC: 134 MG/DL (ref 70–99)
HCT VFR BLD AUTO: 41 % (ref 40–53)
HGB BLD-MCNC: 14.5 G/DL (ref 13.3–17.7)
INTERPRETATION ECG - MUSE: NORMAL
LVEF ECHO: NORMAL
MAGNESIUM SERPL-MCNC: 1.6 MG/DL (ref 1.7–2.3)
MCH RBC QN AUTO: 32.4 PG (ref 26.5–33)
MCHC RBC AUTO-ENTMCNC: 35.4 G/DL (ref 31.5–36.5)
MCV RBC AUTO: 92 FL (ref 78–100)
P AXIS - MUSE: NORMAL DEGREES
PLATELET # BLD AUTO: 187 10E3/UL (ref 150–450)
POTASSIUM SERPL-SCNC: 3.1 MMOL/L (ref 3.4–5.3)
POTASSIUM SERPL-SCNC: 4 MMOL/L (ref 3.4–5.3)
PR INTERVAL - MUSE: NORMAL MS
PROCALCITONIN SERPL IA-MCNC: 0.06 NG/ML
QRS DURATION - MUSE: 70 MS
QT - MUSE: 292 MS
QTC - MUSE: 489 MS
R AXIS - MUSE: 36 DEGREES
RBC # BLD AUTO: 4.48 10E6/UL (ref 4.4–5.9)
SODIUM SERPL-SCNC: 133 MMOL/L (ref 136–145)
SYSTOLIC BLOOD PRESSURE - MUSE: NORMAL MMHG
T AXIS - MUSE: 39 DEGREES
VENTRICULAR RATE- MUSE: 169 BPM
WBC # BLD AUTO: 7.2 10E3/UL (ref 4–11)

## 2023-09-05 PROCEDURE — 99232 SBSQ HOSP IP/OBS MODERATE 35: CPT | Performed by: FAMILY MEDICINE

## 2023-09-05 PROCEDURE — 85027 COMPLETE CBC AUTOMATED: CPT | Performed by: FAMILY MEDICINE

## 2023-09-05 PROCEDURE — 258N000003 HC RX IP 258 OP 636: Performed by: FAMILY MEDICINE

## 2023-09-05 PROCEDURE — 250N000009 HC RX 250: Performed by: FAMILY MEDICINE

## 2023-09-05 PROCEDURE — 84145 PROCALCITONIN (PCT): CPT | Performed by: FAMILY MEDICINE

## 2023-09-05 PROCEDURE — 250N000013 HC RX MED GY IP 250 OP 250 PS 637: Performed by: INTERNAL MEDICINE

## 2023-09-05 PROCEDURE — 250N000011 HC RX IP 250 OP 636: Performed by: INTERNAL MEDICINE

## 2023-09-05 PROCEDURE — 36415 COLL VENOUS BLD VENIPUNCTURE: CPT | Performed by: FAMILY MEDICINE

## 2023-09-05 PROCEDURE — 80048 BASIC METABOLIC PNL TOTAL CA: CPT | Performed by: FAMILY MEDICINE

## 2023-09-05 PROCEDURE — 999N000157 HC STATISTIC RCP TIME EA 10 MIN

## 2023-09-05 PROCEDURE — 84132 ASSAY OF SERUM POTASSIUM: CPT | Performed by: FAMILY MEDICINE

## 2023-09-05 PROCEDURE — 93306 TTE W/DOPPLER COMPLETE: CPT

## 2023-09-05 PROCEDURE — 93306 TTE W/DOPPLER COMPLETE: CPT | Mod: 26 | Performed by: STUDENT IN AN ORGANIZED HEALTH CARE EDUCATION/TRAINING PROGRAM

## 2023-09-05 PROCEDURE — 83735 ASSAY OF MAGNESIUM: CPT | Performed by: FAMILY MEDICINE

## 2023-09-05 PROCEDURE — 250N000011 HC RX IP 250 OP 636: Mod: JZ | Performed by: FAMILY MEDICINE

## 2023-09-05 PROCEDURE — 200N000001 HC R&B ICU

## 2023-09-05 PROCEDURE — 94640 AIRWAY INHALATION TREATMENT: CPT

## 2023-09-05 PROCEDURE — 99222 1ST HOSP IP/OBS MODERATE 55: CPT | Mod: FS | Performed by: INTERNAL MEDICINE

## 2023-09-05 PROCEDURE — 250N000013 HC RX MED GY IP 250 OP 250 PS 637: Performed by: FAMILY MEDICINE

## 2023-09-05 RX ORDER — MAGNESIUM OXIDE 400 MG/1
400 TABLET ORAL EVERY 4 HOURS
Status: COMPLETED | OUTPATIENT
Start: 2023-09-05 | End: 2023-09-05

## 2023-09-05 RX ORDER — DIGOXIN 0.25 MG/ML
350 INJECTION INTRAMUSCULAR; INTRAVENOUS ONCE
Status: COMPLETED | OUTPATIENT
Start: 2023-09-05 | End: 2023-09-05

## 2023-09-05 RX ORDER — DIGOXIN 0.25 MG/ML
175 INJECTION INTRAMUSCULAR; INTRAVENOUS EVERY 6 HOURS
Status: COMPLETED | OUTPATIENT
Start: 2023-09-05 | End: 2023-09-06

## 2023-09-05 RX ORDER — POTASSIUM CHLORIDE 1500 MG/1
40 TABLET, EXTENDED RELEASE ORAL ONCE
Status: COMPLETED | OUTPATIENT
Start: 2023-09-05 | End: 2023-09-05

## 2023-09-05 RX ORDER — DILTIAZEM HYDROCHLORIDE 100 MG/1
2.5-15 INJECTION, POWDER, LYOPHILIZED, FOR SOLUTION INTRAVENOUS CONTINUOUS
Status: DISCONTINUED | OUTPATIENT
Start: 2023-09-05 | End: 2023-09-06

## 2023-09-05 RX ADMIN — ONDANSETRON 4 MG: 4 TABLET, ORALLY DISINTEGRATING ORAL at 09:47

## 2023-09-05 RX ADMIN — SODIUM CHLORIDE 500 ML: 9 INJECTION, SOLUTION INTRAVENOUS at 21:33

## 2023-09-05 RX ADMIN — DIGOXIN 350 MCG: 0.25 INJECTION INTRAMUSCULAR; INTRAVENOUS at 16:12

## 2023-09-05 RX ADMIN — DIGOXIN 175 MCG: 0.25 INJECTION INTRAMUSCULAR; INTRAVENOUS at 21:31

## 2023-09-05 RX ADMIN — SODIUM CHLORIDE 500 ML: 9 INJECTION, SOLUTION INTRAVENOUS at 17:02

## 2023-09-05 RX ADMIN — Medication 400 MG: at 14:36

## 2023-09-05 RX ADMIN — APIXABAN 5 MG: 5 TABLET, FILM COATED ORAL at 21:31

## 2023-09-05 RX ADMIN — DEXTROSE MONOHYDRATE 2.5 MG/HR: 50 INJECTION, SOLUTION INTRAVENOUS at 19:23

## 2023-09-05 RX ADMIN — FOLIC ACID 1 MG: 1 TABLET ORAL at 09:11

## 2023-09-05 RX ADMIN — POTASSIUM CHLORIDE 40 MEQ: 1500 TABLET, EXTENDED RELEASE ORAL at 09:47

## 2023-09-05 RX ADMIN — PANTOPRAZOLE SODIUM 40 MG: 40 TABLET, DELAYED RELEASE ORAL at 07:25

## 2023-09-05 RX ADMIN — METOPROLOL TARTRATE 50 MG: 50 TABLET, FILM COATED ORAL at 21:31

## 2023-09-05 RX ADMIN — MELATONIN 6 MG: 3 TAB ORAL at 21:47

## 2023-09-05 RX ADMIN — MULTIPLE VITAMINS W/ MINERALS TAB 1 TABLET: TAB at 09:11

## 2023-09-05 RX ADMIN — UMECLIDINIUM BROMIDE AND VILANTEROL TRIFENATATE 1 PUFF: 62.5; 25 POWDER RESPIRATORY (INHALATION) at 07:57

## 2023-09-05 RX ADMIN — ATORVASTATIN CALCIUM 80 MG: 40 TABLET, FILM COATED ORAL at 09:10

## 2023-09-05 RX ADMIN — Medication 400 MG: at 09:48

## 2023-09-05 RX ADMIN — MELATONIN 6 MG: 3 TAB ORAL at 02:10

## 2023-09-05 RX ADMIN — APIXABAN 5 MG: 5 TABLET, FILM COATED ORAL at 09:10

## 2023-09-05 RX ADMIN — METOPROLOL TARTRATE 50 MG: 50 TABLET, FILM COATED ORAL at 09:10

## 2023-09-05 RX ADMIN — THIAMINE HCL TAB 100 MG 100 MG: 100 TAB at 09:11

## 2023-09-05 ASSESSMENT — ACTIVITIES OF DAILY LIVING (ADL)
ADLS_ACUITY_SCORE: 25

## 2023-09-05 NOTE — PROGRESS NOTES
Pt alert and oriented x4. Remains in afib, was rate controlled most of shift, writer turned off cardizem drip around 1930. Pts HR jumped back up into the 100s around 0230, gtt was restarted per order. LS clear/dim, remains on 1L nasal cannula; attempted to wean off oxygen but pt was satting around 87-88 on room air. Good urine output, no BM this shift. Pt is fully capable of turning self in bed and was reminded to do so throughout night. PRN melatonin given once for insomnia. Denies pain.

## 2023-09-05 NOTE — PROGRESS NOTES
Interdisciplinary Discharge Planning Note    Anticipated Discharge Date: TBD    Anticipated Discharge Location: Home    Clinical Needs Before Discharge:  stable oxygen requirement    Treatment Needs After Discharge:   No treatment needs after discharge identified at this time    Potential Barriers to Discharge: Patient lives in [place] and may need help with transportation home.     MABLE Drake  9/5/2023,  11:51 AM

## 2023-09-05 NOTE — PROGRESS NOTES
[facility]    Medicine Progress Note - Hospitalist Service    Date of Admission:  9/2/2023    68-year-old male with history of A-fib status post ablation presenting for A-fib with RVR and symptoms of shortness of breath and pulse of 180.  Admitted and placed in ICU on a diltiazem drip. Rate improved on metoprolol. Diltiazem decreased this morning due to controlled rate. Now hypotensive and unable to resume diltiazem. Started digoxin due to past intolerance of sotalol and amiodarone. EP consult requested.    Assessment & Plan   Principal Problem:    Atrial fibrillation with RVR (H)  History of RVR.  January 2020 and February 2021 required cardioversion despite high BB and CCB dosing.    Sotalol utilized in 2021, discontinued due to prolonged QT.  Amiodarone provided, but discontinued due to shortness of breath.  Cardiac ablation performed 5/14/2021 at [facility].  Taken off diltiazem and metoprolol.  Continues with Xarelto for anticoagulation.  Initial pulse in the 180s. Despite max diltiazem pulse elevated over 100, so started on metoprolol with improved pulse.  Rate was in the 90s, now in the 110s with a blood pressure in the 90/70 range.  He took both diltiazem and metoprolol in the past.  Avoid amiodarone due to past side effects.  Consider digoxin or cardioversion.    EP consult placed.  Elected to start digoxin load while waiting for EP consult.  Stop diltiazem      Coronary atherosclerosis  Mild nonobstructive coronary artery disease involving the left main and left anterior descending artery seen on CTA at [facility] April 2021  Continue statin    Acute respiratory failure  History of heart failure associated with A-fib with RVR. Not currently treated for HF on admit.  Last echocardiogram May 2021 with normal EF 60-65%  Updated ECHO 9/5 shows normal EF.   Currently on 1 L O2 placed on admit.     Hypertension   Holding home losartan due to institution of diltiazem and metoprolol. Blood  "pressure 105-110.      Alcohol withdrawal syndrome without complication (H)  Last drink 1 day prior to admission.  On alcohol withdrawal protocol - scoring 0  Elevated bilirubin due to chronic alcohol use      COPD with asthma (H)  Placed on an Anoro Ellipta substituted for home Stiolto (LAMA/LABA combo)      SIRS (systemic inflammatory response syndrome) (H)    Fever, unspecified fever cause  Unclear etiology. Likely respiratory given cough and friend with URI symptoms.  No infiltrate on chest x-ray.  Negative UA.    Received 1 dose of Zosyn and vancomycin in the ED.    Procalcitonin 0.47.  Stable on repeat next 2 mornings.  Near negative on 9/5 despite no further antibiotics.  Negative urine Legionella and strep antigens      Hypomagnesemia  Admit level 1.4. Improved with replacement. Continue replacement.      Generalized weakness  A-fib vs illness. Consider PT and OT once HR improves       Diet: Combination Diet Low Saturated Fat Na <2400mg Diet, No Caffeine Diet    DVT Prophylaxis: DOAC  Sánchez Catheter: Not present  Lines: None     Cardiac Monitoring: ACTIVE order. Indication: Tachyarrhythmias, acute (48 hours)  Code Status: Full Code      Clinically Significant Risk Factors        # Hypokalemia: Lowest K = 3.1 mmol/L in last 2 days, will replace as needed     # Hypomagnesemia: Lowest Mg = 1.6 mg/dL in last 2 days, will replace as needed           # Hypertension: Noted on problem list        # Obesity: Estimated body mass index is 30.28 kg/m  as calculated from the following:    Height as of this encounter: 1.803 m (5' 11\").    Weight as of this encounter: 98.5 kg (217 lb 1.6 oz).  , PRESENT ON ADMISSION            Disposition Plan      Expected Discharge Date: 09/07/2023                  Evan Matson MD  Hospitalist Service  [facility]  Securely message with [employer] (more info)  Text page via [facility] Paging/Directory " "  ______________________________________________________________________    Interval History   \"I feel better than when I got here.\" Unsure of change compared to yesterday. No chest pain or shortness of breath.  Afebrile since admission.  Remains on diltiazem drip.   He was at a cabin with a friend who needs to return to the [facility]. We discussed necessary treatments and friend can stay another day to give him a ride.    Physical Exam   Vital Signs: Temp: 97.1  F (36.2  C) Temp src: Temporal BP: 101/78 Pulse: 112   Resp: 23 SpO2: 95 % O2 Device: Nasal cannula Oxygen Delivery: 1 LPM  Weight: 217 lbs 1.6 oz    General Appearance: Alert. No acute distress  Chest/Respiratory Exam: Clear to auscultation bilaterally  Cardiovascular Exam: Tachycardic.  No murmurs gallops rubs  Gastrointestinal Exam: Soft, nontender, no abnormal masses  Neurological: No tremor.   Extremities: No lower extremity edema.  Psychiatric: Flat affect      Medical Decision Making             Data     I have personally reviewed the following data over the past 24 hrs:    7.2  \   14.5   / 187     133 (L) 91 (L) 19.1 /  134 (H)   3.1 (L) 32 (H) 0.83 \     Procal: 0.06 (H) CRP: N/A Lactic Acid: N/A         Imaging results reviewed over the past 24 hrs:   Recent Results (from the past 24 hour(s))   Echocardiogram Complete   Result Value    LVEF  55-60%    Narrative    [id number]  [id number]  [id number]  [id number]^KENNETH^SERENITY^S     [facility]  [address]  [place] [address]     Name: BERNARD ARGUETA  MRN: [id number]  : 1955  Study Date: 2023 07:20 AM  Age: 68 yrs  Gender: Male  Patient Location: [facility]  Reason For Study: Atrial Fibrillation  Ordering Physician: SERENITY COOPER  Performed By: Nubia Johnson     BSA: 2.2 m2  Height: 71 in  Weight: 217 lb  HR: 95  BP: 118/84 mmHg  ______________________________________________________________________________  Procedure  Echocardiogram with two-dimensional, " color and spectral Doppler performed.  ______________________________________________________________________________  Interpretation Summary  Global and regional left ventricular function is normal with an EF of 55-60%.  Mild concentric wall thickening consistent with left ventricular hypertrophy  is present.  Global right ventricular function is borderline reduced.  Mild dilatation of the aorta is present. Ascending aorta 4.1 cm.  Trivial pericardial effusion is present.  ______________________________________________________________________________  Left Ventricle  Global and regional left ventricular function is normal with an EF of 55-60%.  Borderline left ventricular dilation is present. Mild concentric wall  thickening consistent with left ventricular hypertrophy is present. Diastolic  function not assessed due to arrhythmia.     Right Ventricle  The right ventricle is normal size. Global right ventricular function is  borderline reduced.     Atria  Both atria appear normal.     Mitral Valve  The mitral valve is normal. Trace mitral insufficiency is present.     Aortic Valve  Aortic valve sclerosis is present. On Doppler interrogation, there is no  significant stenosis or regurgitation.     Tricuspid Valve  The tricuspid valve is normal. Trace tricuspid insufficiency is present. The  right ventricular systolic pressure is approximated at 15.4 mmHg plus the  right atrial pressure. Pulmonary artery systolic pressure is normal.     Pulmonic Valve  The pulmonic valve is normal. Trace pulmonic insufficiency is present.     Vessels  Mild dilatation of the aorta is present. Ascending aorta 4.1 cm. IVC diameter  and respiratory changes fall into an intermediate range suggesting an RA  pressure of 8 mmHg.     Pericardium  Trivial pericardial effusion is present.     ______________________________________________________________________________  MMode/2D Measurements & Calculations  IVSd: 1.0 cm  LVIDd: 5.7 cm  LVIDs:  4.6 cm  LVPWd: 1.2 cm  FS: 18.2 %  LV mass(C)d: 251.5 grams  LV mass(C)dI: 115.2 grams/m2  Ao root diam: 3.9 cm  asc Aorta Diam: 4.1 cm     LVOT diam: 2.0 cm  LVOT area: 3.1 cm2  Ao root diam Index (cm/m2): 1.8  asc Aorta Diam Index (cm/m2): 1.9  LA Volume (BP): 41.6 ml  LA Volume Index (BP): 19.1 ml/m2  RWT: 0.42  TAPSE: 1.3 cm     Doppler Measurements & Calculations  MV E max lonny: 87.3 cm/sec     MV dec slope: 532.0 cm/sec2  MV dec time: 0.16 sec  Ao V2 max: 78.4 cm/sec  Ao max P.0 mmHg  Ao V2 mean: 55.2 cm/sec  Ao mean P.3 mmHg  Ao V2 VTI: 13.0 cm  BRII(I,D): 2.9 cm2  BRII(V,D): 3.1 cm2  LV V1 max P.5 mmHg  LV V1 max: 77.7 cm/sec  LV V1 VTI: 12.1 cm  SV(LVOT): 38.1 ml  SI(LVOT): 17.5 ml/m2  PA acc time: 0.11 sec  TR max lonny: 196.0 cm/sec  TR max PG: 15.4 mmHg  AV Lonny Ratio (DI): 0.99  BRII Index (cm2/m2): 1.3  E/E' av.1  Lateral E/e': 11.8  Medial E/e': 14.3     ______________________________________________________________________________  Report approved by: MD Beto Tripp 2023 09:05 AM

## 2023-09-05 NOTE — CONSULTS
ELECTROPHYSIOLOGY VIRTUAL CONSULT    Assessment/Recommendations   Assessment/Plan:    Mr. Jessica is a 68 year old male who has a medical history significant for PAF (CHADSVASC 2 on Xarelto) s/p DCCVs, s/p PVI 2021, HTN, HLD, CKD, COPD, Bell's palsy, prostate CA,  Galeas's esophagus, obesity, and ETOH abuse.     Persistent Atrial Fibrillation:   We discussed in detail with the patient management/treatment options for MariaE lena includin. Stroke Prophylaxis:  CHADSVASC=+HTN, +age  2, corresponding to a 2.2% annual stroke / systemic emolism event rate. indicating need for long term oral anticoagulation.  He is on Eliquis and reports no bleeding issues. He reports no recent interruption to his OAC.   2. Rate Control: He was on Diltiazem gtt and Metoprolol. Then Diltiazem stopped given softer BPs. He was just started on Digoxin load. Do not feel strongly about continuing digoxin if able to do DCCV.   3. Rhythm Control: He had prior DCCV in  and . He was on Sotalol which was stopped due to QTc prolongation. He had then been on amiodarone but this was stopped due to concern for it causing SOB symptom. He then had PVI in 2021. Now with recurrent AF. Recommend DCCV given this is first known recurrence. If continued issues, then would need to consider other rhyhtm control measures including AAT and/or repeat ablation.   4. Risk Factor Management: Statin, BP control, and DEBBY evaluation as indicated.    Advise follow-up in 4-6 weeks post DCCV. He can follow up with his prior EP team.        History of Present Illness/Subjective    Mr. Дмитрий Jessica is a 68 year old male who comes in today for EP consultation of AF.    Mr. Jessica is a 68 year old male who has a medical history significant for PAF (CHADSVASC 2 on Xarelto) s/p DCCVs, s/p PVI 2021, HTN, HLD, CKD, COPD, Bell's palsy, prostate CA,  Galeas's esophagus, obesity, and ETOH abuse.     He first was diagnosed with AF in 2020. He underwent a ROBEL/DCCV  at that time. He did have some HF associated with his AF with RVR. He was then admitted to OSH 2/16/21-2/19/21 with AF with RVR. He had DCCV and was having intermittent runs of AT post. An echo from 2/17/21 reported LVEF 50-55%, moderate LAE. He was started on Sotalol. He was initiated on Sotalol but then this was stopped due to QTc prolongation. He was then started on amiodarone. He was seen by EP at [facility] in clinic in 3/23/21 and was doing well. He has been on Xarelto for stroke prophylaxis. He underwent CTA that showed nonobstructive CAD. On 5/14/21. Ultimately, he underwent PVI ablation on 5/14/21 at [facility]. An EGD 12/3/21 suspicious for Galeas's and he remains on PPI.     He then presented to [facility] ER on 9/2/23 with SOB/LOUIE. He had been drinking heavily for a few days prior but nothing within 24 hours of admission. He had a friend who has a cardiac monitoring device and he used this. It reported HRs up to 190s prompting him to seek evaluation.  He was found to have AF with RVR on admission and fever 101.9. He was given IV diltiazem bolus and gtt. He was also started on abx and admitted. Unclear etiology for his fevers. No infiltrate on CXR, UA negative, procal 0.47, and near negative on 9/5 despite no further abx given. An updated echo from 9/5/23 showed LVEF 60-65%.       He reports feeling some SOB and elevated HRs. He denies chest discomfort, abdominal fullness/bloating or peripheral edema, shortness of breath, lightheadedness, dizziness, pre-syncope, or syncope. Presenting 12 lead ECG shows AF with RVR  Vent Rate 169 bpm, QRS 70 ms, QTc 489 ms. Current cardiac medications include: Digoxin, Metoprolol, Losartan, Lipitor, and Eliquis .         I have reviewed and updated the patient's Past Medical History, Social History, Family History and Medication List.     Cardiographics (Personally Reviewed) :   9/5/23 Echo:   Interpretation Summary  Global and regional left ventricular function is normal with  "an EF of 55-60%.  Mild concentric wall thickening consistent with left ventricular hypertrophy  is present.  Global right ventricular function is borderline reduced.  Mild dilatation of the aorta is present. Ascending aorta 4.1 cm.  Trivial pericardial effusion is present.    12 lead ECG:       Physical Examination   /74 (BP Location: Right arm)   Pulse 103   Temp 97.1  F (36.2  C) (Temporal)   Resp 27   Ht 1.803 m (5' 11\")   Wt 98.5 kg (217 lb 1.6 oz)   SpO2 93%   BMI 30.28 kg/m    Wt Readings from Last 3 Encounters:   09/05/23 98.5 kg (217 lb 1.6 oz)     The rest of a comprehensive physical examination is deferred due to nature of video visit restrictions.  CONSITUTIONAL: no acute distress  HEENT: no icterus, no redness or discharge, neck supple  CV: no visible edema of visualized extremities. No JVD.   RESPIRATORY: respirations nonlabored, no cough  NEURO: AA&Ox3, speech fluent/appropriate, motor grossly nonfocal  PSYCH: cooperative, affect appropriate  DERM: no rashes on visualized face/neck/upper extremities       Medications  Allergies   No current outpatient medications on file.     Current Facility-Administered Medications   Medication     0.9% sodium chloride BOLUS     acetaminophen (TYLENOL) tablet 650 mg     apixaban ANTICOAGULANT (ELIQUIS) tablet 5 mg     artificial tears (GENTEAL) 0.1-0.2-0.3 % ophthalmic solution 1 drop     atorvastatin (LIPITOR) tablet 80 mg     digoxin (LANOXIN) injection 175 mcg     flumazenil (ROMAZICON) injection 0.2 mg     folic acid (FOLVITE) tablet 1 mg     OLANZapine zydis (zyPREXA) ODT tab 5-10 mg    Or     haloperidol lactate (HALDOL) injection 2.5-5 mg     lidocaine (LMX4) cream     lidocaine 1 % 0.1-1 mL     LORazepam (ATIVAN) tablet 1-2 mg    Or     LORazepam (ATIVAN) injection 1-2 mg     melatonin tablet 6 mg     metoprolol (LOPRESSOR) injection 5 mg     metoprolol tartrate (LOPRESSOR) tablet 50 mg     multivitamin w/minerals (THERA-VIT-M) tablet 1 tablet "     ondansetron (ZOFRAN ODT) ODT tab 4 mg    Or     ondansetron (ZOFRAN) injection 4 mg     pantoprazole (PROTONIX) EC tablet 40 mg     Patient is already receiving anticoagulation with heparin, enoxaparin (LOVENOX), warfarin (COUMADIN)  or other anticoagulant medication     sodium chloride (PF) 0.9% PF flush 3 mL     sodium chloride (PF) 0.9% PF flush 3 mL     thiamine (B-1) tablet 100 mg     umeclidinium-vilanterol (ANORO ELLIPTA) 62.5-25 MCG/ACT oral inhaler 1 puff      Allergies   Allergen Reactions     Bacitracin Swelling, Other (See Comments) and Unknown     throat swelled up     Cephalexin Anaphylaxis     Sotalol Other (See Comments)     Qt prolongation in February 2021     Codeine Nausea and Hives     Rivaroxaban Other (See Comments)     Hair loss         Lab Results (Personally Reviewed)    Chemistry/lipid CBC Cardiac Enzymes/BNP/TSH/INR   Lab Results   Component Value Date    BUN 19.1 09/05/2023     (L) 09/05/2023    CO2 32 (H) 09/05/2023     Creatinine   Date Value Ref Range Status   09/05/2023 0.83 0.67 - 1.17 mg/dL Final       No results found for: CHOL, HDL, LDL, CHOLHDL   Lab Results   Component Value Date    WBC 7.2 09/05/2023    HGB 14.5 09/05/2023    HCT 41.0 09/05/2023    MCV 92 09/05/2023     09/05/2023    No results found for: CKTOTAL, CKMB, TROPONINI, BNP, TSH, INR              Video-Visit Details    Type of service:  Video Visit   Video Start Time: 730pm  Video End Time 800pm    Originating Location (pt. Location): [facility]  Distant Location (provider location):  On-site  Platform used for Video Visit: Other: [email or web address]    I have reviewed the note as documented above.  This accurately captures the substance of my virtual visit with the patient. The patient states understanding and is agreeable with the plan.   Harvinder Phillips MD [facility]  Cardiology - Electrophysiology    Total time spent on patient visit, reviewing notes, imaging, labs, orders, and  completing necessary documentation: 60 minutes.  >50% of visit spent on counseling patient and/or coordination of care.

## 2023-09-05 NOTE — PROGRESS NOTES
:    Met with patient in his room to discuss discharge planning needs. Patient reports he lives alone in [place]. He stated he plans to drive himself home when he is discharged from the hospital. He stated he wasn't interested in receiving any information about resources available in his area.      will continue to follow.     MABLE Drake on 9/5/2023 at 2:09 PM

## 2023-09-05 NOTE — PROGRESS NOTES
Shift Summary    Patient is currently on 1 LPM, Sp02 92-94%. Pt desaturates down to 86-87% on Room Air. No further interventions at this time.    Priya Godoy, RT

## 2023-09-05 NOTE — PROGRESS NOTES
Patient's blood pressure has been lower in the 90s/70s. MAP around 85. Diltiazem off due to pressure.  He is currently on metoprolol 50 mg BID and had a digoxin load  EP consult pending  Considered cardioversion, but he ate, so unable to sedate for 8 hours (around midnight)  Discussed options with teleICU. Consider either slow diltiazem start or amiodarone  We will restart diltiazem for now. If he has lower pressures or is becoming unstable we can consider amiodarone vs cardioversion.

## 2023-09-06 ENCOUNTER — ANESTHESIA (OUTPATIENT)
Dept: INTENSIVE CARE | Facility: OTHER | Age: 68
DRG: 309 | End: 2023-09-06
Payer: COMMERCIAL

## 2023-09-06 ENCOUNTER — ANESTHESIA EVENT (OUTPATIENT)
Dept: INTENSIVE CARE | Facility: OTHER | Age: 68
DRG: 309 | End: 2023-09-06
Payer: COMMERCIAL

## 2023-09-06 LAB
ANION GAP SERPL CALCULATED.3IONS-SCNC: 6 MMOL/L (ref 7–15)
BUN SERPL-MCNC: 18.8 MG/DL (ref 8–23)
CALCIUM SERPL-MCNC: 9.2 MG/DL (ref 8.8–10.2)
CHLORIDE SERPL-SCNC: 95 MMOL/L (ref 98–107)
CREAT SERPL-MCNC: 0.81 MG/DL (ref 0.67–1.17)
DEPRECATED HCO3 PLAS-SCNC: 33 MMOL/L (ref 22–29)
ERYTHROCYTE [DISTWIDTH] IN BLOOD BY AUTOMATED COUNT: 13.8 % (ref 10–15)
GFR SERPL CREATININE-BSD FRML MDRD: >90 ML/MIN/1.73M2
GLUCOSE SERPL-MCNC: 133 MG/DL (ref 70–99)
HCT VFR BLD AUTO: 40.7 % (ref 40–53)
HGB BLD-MCNC: 14.3 G/DL (ref 13.3–17.7)
MAGNESIUM SERPL-MCNC: 1.8 MG/DL (ref 1.7–2.3)
MCH RBC QN AUTO: 32.1 PG (ref 26.5–33)
MCHC RBC AUTO-ENTMCNC: 35.1 G/DL (ref 31.5–36.5)
MCV RBC AUTO: 91 FL (ref 78–100)
PLATELET # BLD AUTO: 194 10E3/UL (ref 150–450)
POTASSIUM SERPL-SCNC: 3.8 MMOL/L (ref 3.4–5.3)
RBC # BLD AUTO: 4.46 10E6/UL (ref 4.4–5.9)
SODIUM SERPL-SCNC: 134 MMOL/L (ref 136–145)
WBC # BLD AUTO: 6.9 10E3/UL (ref 4–11)

## 2023-09-06 PROCEDURE — 99140 ANES COMP EMERGENCY COND: CPT | Performed by: NURSE ANESTHETIST, CERTIFIED REGISTERED

## 2023-09-06 PROCEDURE — 250N000013 HC RX MED GY IP 250 OP 250 PS 637: Performed by: INTERNAL MEDICINE

## 2023-09-06 PROCEDURE — 250N000009 HC RX 250: Performed by: FAMILY MEDICINE

## 2023-09-06 PROCEDURE — 36415 COLL VENOUS BLD VENIPUNCTURE: CPT | Performed by: FAMILY MEDICINE

## 2023-09-06 PROCEDURE — 99233 SBSQ HOSP IP/OBS HIGH 50: CPT | Mod: 25 | Performed by: STUDENT IN AN ORGANIZED HEALTH CARE EDUCATION/TRAINING PROGRAM

## 2023-09-06 PROCEDURE — 85027 COMPLETE CBC AUTOMATED: CPT | Performed by: FAMILY MEDICINE

## 2023-09-06 PROCEDURE — 999N000157 HC STATISTIC RCP TIME EA 10 MIN

## 2023-09-06 PROCEDURE — 5A2204Z RESTORATION OF CARDIAC RHYTHM, SINGLE: ICD-10-PCS | Performed by: STUDENT IN AN ORGANIZED HEALTH CARE EDUCATION/TRAINING PROGRAM

## 2023-09-06 PROCEDURE — 258N000003 HC RX IP 258 OP 636: Performed by: FAMILY MEDICINE

## 2023-09-06 PROCEDURE — 250N000011 HC RX IP 250 OP 636: Mod: JZ | Performed by: STUDENT IN AN ORGANIZED HEALTH CARE EDUCATION/TRAINING PROGRAM

## 2023-09-06 PROCEDURE — 80048 BASIC METABOLIC PNL TOTAL CA: CPT | Performed by: FAMILY MEDICINE

## 2023-09-06 PROCEDURE — 93005 ELECTROCARDIOGRAM TRACING: CPT

## 2023-09-06 PROCEDURE — 250N000013 HC RX MED GY IP 250 OP 250 PS 637: Performed by: FAMILY MEDICINE

## 2023-09-06 PROCEDURE — 250N000011 HC RX IP 250 OP 636: Mod: JZ | Performed by: FAMILY MEDICINE

## 2023-09-06 PROCEDURE — 93010 ELECTROCARDIOGRAM REPORT: CPT | Mod: XU | Performed by: STUDENT IN AN ORGANIZED HEALTH CARE EDUCATION/TRAINING PROGRAM

## 2023-09-06 PROCEDURE — 120N000001 HC R&B MED SURG/OB

## 2023-09-06 PROCEDURE — 92960 CARDIOVERSION ELECTRIC EXT: CPT | Performed by: STUDENT IN AN ORGANIZED HEALTH CARE EDUCATION/TRAINING PROGRAM

## 2023-09-06 PROCEDURE — 92960 CARDIOVERSION ELECTRIC EXT: CPT | Performed by: NURSE ANESTHETIST, CERTIFIED REGISTERED

## 2023-09-06 PROCEDURE — 83735 ASSAY OF MAGNESIUM: CPT | Performed by: FAMILY MEDICINE

## 2023-09-06 PROCEDURE — 250N000011 HC RX IP 250 OP 636: Performed by: NURSE ANESTHETIST, CERTIFIED REGISTERED

## 2023-09-06 PROCEDURE — 94640 AIRWAY INHALATION TREATMENT: CPT

## 2023-09-06 RX ORDER — MAGNESIUM SULFATE HEPTAHYDRATE 40 MG/ML
2 INJECTION, SOLUTION INTRAVENOUS ONCE
Status: COMPLETED | OUTPATIENT
Start: 2023-09-06 | End: 2023-09-06

## 2023-09-06 RX ORDER — PROPOFOL 10 MG/ML
INJECTION, EMULSION INTRAVENOUS PRN
Status: DISCONTINUED | OUTPATIENT
Start: 2023-09-06 | End: 2023-09-06

## 2023-09-06 RX ADMIN — DEXTROSE MONOHYDRATE 10 MG/HR: 50 INJECTION, SOLUTION INTRAVENOUS at 05:03

## 2023-09-06 RX ADMIN — APIXABAN 5 MG: 5 TABLET, FILM COATED ORAL at 09:12

## 2023-09-06 RX ADMIN — METOPROLOL TARTRATE 50 MG: 50 TABLET, FILM COATED ORAL at 21:30

## 2023-09-06 RX ADMIN — THIAMINE HCL TAB 100 MG 100 MG: 100 TAB at 09:12

## 2023-09-06 RX ADMIN — APIXABAN 5 MG: 5 TABLET, FILM COATED ORAL at 21:30

## 2023-09-06 RX ADMIN — MULTIPLE VITAMINS W/ MINERALS TAB 1 TABLET: TAB at 09:12

## 2023-09-06 RX ADMIN — MAGNESIUM SULFATE HEPTAHYDRATE 2 G: 40 INJECTION, SOLUTION INTRAVENOUS at 08:43

## 2023-09-06 RX ADMIN — PROPOFOL 100 MG: 10 INJECTION, EMULSION INTRAVENOUS at 08:40

## 2023-09-06 RX ADMIN — METOPROLOL TARTRATE 50 MG: 50 TABLET, FILM COATED ORAL at 09:12

## 2023-09-06 RX ADMIN — PANTOPRAZOLE SODIUM 40 MG: 40 TABLET, DELAYED RELEASE ORAL at 07:45

## 2023-09-06 RX ADMIN — ATORVASTATIN CALCIUM 80 MG: 40 TABLET, FILM COATED ORAL at 09:11

## 2023-09-06 RX ADMIN — DIGOXIN 175 MCG: 0.25 INJECTION INTRAMUSCULAR; INTRAVENOUS at 03:10

## 2023-09-06 RX ADMIN — UMECLIDINIUM BROMIDE AND VILANTEROL TRIFENATATE 1 PUFF: 62.5; 25 POWDER RESPIRATORY (INHALATION) at 06:16

## 2023-09-06 RX ADMIN — FOLIC ACID 1 MG: 1 TABLET ORAL at 09:12

## 2023-09-06 ASSESSMENT — ACTIVITIES OF DAILY LIVING (ADL)
ADLS_ACUITY_SCORE: 25

## 2023-09-06 ASSESSMENT — ENCOUNTER SYMPTOMS: DYSRHYTHMIAS: 1

## 2023-09-06 ASSESSMENT — COPD QUESTIONNAIRES: COPD: 1

## 2023-09-06 NOTE — PROCEDURES
Procedure Note   Procedure: Cardioversion  Diagnosis: Atrial Fibrillation     Previous records reviewed, including available EKGs and echocardiograms. Diagnosis discussed, clinical treatment options discussed. After an informed discussion, the patient decided to proceed. Consent obtained. Time out was performed.  Anesthesia present to manage airway and sedation. Pads and electrodes placed by nursing staff. After the patient was sedate, the defibrillator was changed to synchronize mode. A 150 J synchronized shock was delivered and was unsuccessful. A second 200J synchronized shock was delivered and was successful. There was a return to normal sinus rhythm with occasional premature atrial contractions.      Complications: None.     Post-procedure EKG reviewed: Sinus rhythm.   Post-procedure medication changes: 2g IV magnsesium one time. Stop diltiazem drip.         Stone Palafox MD on 9/6/2023 at 8:26 AM

## 2023-09-06 NOTE — PROGRESS NOTES
Cardizem started at 1930, HR and BP stable through the shift. Drip is currently at   5 mg/ml, HR 70-85 and systolic blood pressures greater than 100. Patient had no complaints over night.

## 2023-09-06 NOTE — ANESTHESIA PREPROCEDURE EVALUATION
Anesthesia Pre-Procedure Evaluation    Patient: Дмитрий Jessica   MRN: [id number] : 1955        Procedure :           No past medical history on file.   No past surgical history on file.   Allergies   Allergen Reactions    Bacitracin Swelling, Other (See Comments) and Unknown     throat swelled up    Cephalexin Anaphylaxis    Sotalol Other (See Comments)     Qt prolongation in 2021    Codeine Nausea and Hives    Rivaroxaban Other (See Comments)     Hair loss      Social History     Tobacco Use    Smoking status: Not on file    Smokeless tobacco: Not on file   Substance Use Topics    Alcohol use: Not on file      Wt Readings from Last 1 Encounters:   23 100.2 kg (221 lb)        Anesthesia Evaluation   Pt has had prior anesthetic.     No history of anesthetic complications       ROS/MED HX  ENT/Pulmonary:     (+)                    Mild Persistent, asthma    COPD,              Neurologic:  - neg neurologic ROS     Cardiovascular:     (+) Dyslipidemia hypertension- -  CAD -  - -                        dysrhythmias, a-fib,             METS/Exercise Tolerance: 3 - Able to walk 1-2 blocks without stopping    Hematologic:  - neg hematologic  ROS     Musculoskeletal:  - neg musculoskeletal ROS     GI/Hepatic:  - neg GI/hepatic ROS     Renal/Genitourinary:  - neg Renal ROS     Endo:     (+)               Obesity,       Psychiatric/Substance Use:     (+)   alcohol abuse      Infectious Disease:  - neg infectious disease ROS     Malignancy:  - neg malignancy ROS     Other:  - neg other ROS          Physical Exam    Airway        Mallampati: II   TM distance: > 3 FB   Neck ROM: full   Mouth opening: > 3 cm    Respiratory Devices and Support         Dental       (+) Modest Abnormalities - crowns, retainers, 1 or 2 missing teeth      Cardiovascular   cardiovascular exam normal       Rhythm and rate: irregular and normal     Pulmonary   pulmonary exam normal        breath sounds clear to auscultation            OUTSIDE LABS:  CBC:   Lab Results   Component Value Date    WBC 6.9 09/06/2023    WBC 7.2 09/05/2023    HGB 14.3 09/06/2023    HGB 14.5 09/05/2023    HCT 40.7 09/06/2023    HCT 41.0 09/05/2023     09/06/2023     09/05/2023     BMP:   Lab Results   Component Value Date     (L) 09/06/2023     (L) 09/05/2023    POTASSIUM 3.8 09/06/2023    POTASSIUM 4.0 09/05/2023    CHLORIDE 95 (L) 09/06/2023    CHLORIDE 91 (L) 09/05/2023    CO2 33 (H) 09/06/2023    CO2 32 (H) 09/05/2023    BUN 18.8 09/06/2023    BUN 19.1 09/05/2023    CR 0.81 09/06/2023    CR 0.83 09/05/2023     (H) 09/06/2023     (H) 09/05/2023     COAGS: No results found for: PTT, INR, FIBR  POC: No results found for: BGM, HCG, HCGS  HEPATIC:   Lab Results   Component Value Date    ALBUMIN 3.7 09/03/2023    PROTTOTAL 6.2 (L) 09/03/2023    ALT 17 09/03/2023    AST 24 09/03/2023    ALKPHOS 75 09/03/2023    BILITOTAL 1.6 (H) 09/03/2023     OTHER:   Lab Results   Component Value Date    LACT 1.3 09/02/2023    TACO 9.2 09/06/2023    PHOS 3.8 09/03/2023    MAG 1.8 09/06/2023       Anesthesia Plan    ASA Status:  3, emergent    NPO Status:  NPO Appropriate    Anesthesia Type: MAC.   Induction: Propofol.           Consents    Anesthesia Plan(s) and associated risks, benefits, and realistic alternatives discussed. Questions answered and patient/representative(s) expressed understanding.     - Discussed: Risks, Benefits and Alternatives for BOTH SEDATION and the PROCEDURE were discussed     - Discussed with:  Patient      - Extended Intubation/Ventilatory Support Discussed: No.      - Patient is DNR/DNI Status: No     Use of blood products discussed: No .     Postoperative Care            Comments:                ODIN MILLER CRNA

## 2023-09-06 NOTE — ANESTHESIA CARE TRANSFER NOTE
Patient: Дмитрий Jessica    Procedure: * No procedures listed *       Diagnosis: * No pre-op diagnosis entered *  Diagnosis Additional Information: No value filed.    Anesthesia Type:   MAC     Note:    Oropharynx: oropharynx clear of all foreign objects  Level of Consciousness: awake  Oxygen Supplementation: face mask  Level of Supplemental Oxygen (L/min / FiO2): 8  Independent Airway: airway patency satisfactory and stable  Dentition: dentition unchanged  Vital Signs Stable: post-procedure vital signs reviewed and stable  Report to RN Given: handoff report given  Patient transferred to: ICU    ICU Handoff: Call for PAUSE to initiate/utilize ICU HANDOFF, Identified Patient, Identified Responsible Provider, Reviewed the Pertinent Medical History, Discussed Surgical Course, Reviewed Intra-OP Anesthesia Management and Issues during Anesthesia, Set Expectations for Post Procedure Period and Allowed Opportunity for Questions and Acknowledgement of Understanding      Vitals:  Vitals Value Taken Time   /70 09/06/23 0845   Temp     Pulse 67 09/06/23 0846   Resp 28 09/06/23 0846   SpO2 98 % 09/06/23 0846   Vitals shown include unvalidated device data.    Electronically Signed By: ODIN MILLER CRNA  September 6, 2023  8:47 AM

## 2023-09-06 NOTE — PROGRESS NOTES
Received report from Elvis in ICU.  Patient transferred into room 312 from ICU.  He is noted to be A&Ox3 pleasant without concerns.

## 2023-09-06 NOTE — PROGRESS NOTES
[facility]    Medicine Progress Note - Hospitalist Service    Date of Admission:  9/2/2023    68-year-old male with history of A-fib status post ablation presenting for A-fib with RVR and symptoms of shortness of breath and pulse of 180.  Admitted and placed in ICU on a diltiazem drip. Rate improved on metoprolol. Diltiazem decreased this morning due to controlled rate. Now hypotensive and unable to resume diltiazem. Started digoxin due to past intolerance of sotalol and amiodarone. EP consult requested and due to patient being anticoagulated on Elliquis will attempt cardioversion. If unsuccessful will amio load and attempt cardioversion again tomorrow.     Assessment & Plan   Principal Problem:    Atrial fibrillation with RVR (H)  History of RVR.  January 2020 and February 2021 required cardioversion despite high BB and CCB dosing.    Sotalol utilized in 2021, discontinued due to prolonged QT.  Amiodarone provided, but discontinued due to shortness of breath.  Cardiac ablation performed 5/14/2021 at [facility].  Taken off diltiazem and metoprolol.  Continues with Xarelto for anticoagulation.  Initial pulse in the 180s. Despite max diltiazem pulse elevated over 100, so started on metoprolol with improved pulse.  Rate was in the 90s, now in the 110s with a blood pressure in the 90/70 range.  He took both diltiazem and metoprolol in the past.  Avoid amiodarone due to past side effects.  Appreciate EP assistance.   Elected to start digoxin load while waiting for EP consult.  -cardioversion today  -magnesium 2g IV one time      Coronary atherosclerosis  Mild nonobstructive coronary artery disease involving the left main and left anterior descending artery seen on CTA at [facility] April 2021  Continue statin    Acute respiratory failure  History of heart failure associated with A-fib with RVR. Not currently treated for HF on admit.  Last echocardiogram May 2021 with normal EF 60-65%  Updated ECHO  "9/5 shows normal EF.   Currently on 1 L O2 placed on admit.     Hypertension   Holding home losartan due to institution of diltiazem and metoprolol. Blood pressure 105-110.      Alcohol withdrawal syndrome without complication (H)  Last drink 1 day prior to admission.  On alcohol withdrawal protocol - scoring 0  Elevated bilirubin due to chronic alcohol use      COPD with asthma (H)  Placed on an Anoro Ellipta substituted for home Stiolto (LAMA/LABA combo)      SIRS (systemic inflammatory response syndrome) (H)    Fever, unspecified fever cause  Unclear etiology. Likely respiratory given cough and friend with URI symptoms.  No infiltrate on chest x-ray.  Negative UA.    Received 1 dose of Zosyn and vancomycin in the ED.    Procalcitonin 0.47.  Stable on repeat next 2 mornings.  Near negative on 9/5 despite no further antibiotics.  Negative urine Legionella and strep antigens      Hypomagnesemia  Admit level 1.4. Improved with replacement. Continue replacement.      Generalized weakness  A-fib vs illness. Consider PT and OT once HR improves       Diet: NPO per Anesthesia Guidelines for Procedure/Surgery Except for: Meds    DVT Prophylaxis: DOAC  Sánchez Catheter: Not present  Lines: None     Cardiac Monitoring: ACTIVE order. Indication: Tachyarrhythmias, acute (48 hours)  Code Status: Full Code      Clinically Significant Risk Factors        # Hypokalemia: Lowest K = 3.1 mmol/L in last 2 days, will replace as needed     # Hypomagnesemia: Lowest Mg = 1.6 mg/dL in last 2 days, will replace as needed           # Hypertension: Noted on problem list        # Obesity: Estimated body mass index is 30.82 kg/m  as calculated from the following:    Height as of this encounter: 1.803 m (5' 11\").    Weight as of this encounter: 100.2 kg (221 lb).  , PRESENT ON ADMISSION            Disposition Plan     Expected Discharge Date: 09/07/2023                  Stone Palafox MD  Hospitalist Service  [facility]  Securely message with [employer] (more info)  Text page via [employer] Paging/Directory   ______________________________________________________________________    Interval History   Feels ok this morning  Wants to trial cardioversion to get better.   No chest pain  Still on supplemental oxygen.   Back on diltiazem drip      Physical Exam   Vital Signs: Temp: 98.7  F (37.1  C) Temp src: Tympanic BP: 105/73 Pulse: 69   Resp: 22 SpO2: (!) 89 % O2 Device: Nasal cannula Oxygen Delivery: 1 LPM  Weight: 221 lbs 0 oz    General Appearance: Alert. No acute distress  Chest/Respiratory Exam: Clear to auscultation bilaterally, supplemental oxygen in place  Cardiovascular Exam: irregularly irregular rhythm.   No murmurs gallops rubs  Gastrointestinal Exam: Soft, nontender, no abnormal masses  Neurological: No tremor.   Extremities: No lower extremity edema.  Psychiatric: Flat affect      Medical Decision Making             Data     I have personally reviewed the following data over the past 24 hrs:    6.9  \   14.3   / 194     134 (L) 95 (L) 18.8 /  133 (H)   3.8 33 (H) 0.81 \       Imaging results reviewed over the past 24 hrs:   No results found for this or any previous visit (from the past 24 hour(s)).

## 2023-09-06 NOTE — PLAN OF CARE
"Patient is laying bed.  He is able to ambulate with SBA.    Problem: Plan of Care - These are the overarching goals to be used throughout the patient stay.    Goal: Plan of Care Review  Description: The Plan of Care Review/Shift note should be completed every shift.  The Outcome Evaluation is a brief statement about your assessment that the patient is improving, declining, or no change.  This information will be displayed automatically on your shift note.  Outcome: Progressing  Goal: Patient-Specific Goal (Individualized)  Description: You can add care plan individualizations to a care plan. Examples of Individualization might be:  \"Parent requests to be called daily at 9am for status\", \"I have a hard time hearing out of my right ear\", or \"Do not touch me to wake me up as it startles me\".  Outcome: Progressing  Goal: Absence of Hospital-Acquired Illness or Injury  Outcome: Progressing  Goal: Optimal Comfort and Wellbeing  Outcome: Progressing  Goal: Readiness for Transition of Care  Outcome: Progressing     Problem: Excessive Substance Use  Goal: Optimized Energy Level (Excessive Substance Use)  Outcome: Progressing  Goal: Improved Behavioral Control (Excessive Substance Use)  Outcome: Progressing  Goal: Increased Participation and Engagement (Excessive Substance Use)  Outcome: Progressing  Goal: Improved Physiologic Symptoms (Excessive Substance Use)  Outcome: Progressing  Goal: Enhanced Social, Occupational or Functional Skills (Excessive Substance Use)  Outcome: Progressing   Goal Outcome Evaluation:                        "

## 2023-09-06 NOTE — PROGRESS NOTES
Interdisciplinary Discharge Planning Note    Anticipated Discharge Date: 9/7    Anticipated Discharge Location: Home    Clinical Needs Before Discharge:  stable oxygen requirement    Treatment Needs After Discharge:  None identified    Potential Barriers to Discharge: None identified    MABLE Drake  9/6/2023,  1:10 PM

## 2023-09-06 NOTE — PLAN OF CARE
Goal: Absence of Hospital-Acquired Illness or Injury  9/6/2023 0644 by Sapphire Neil RN  Outcome: Progressing  9/6/2023 0643 by Sapphire Neil RN  Outcome: Progressing  Intervention: Identify and Manage Fall Risk  Recent Flowsheet Documentation  Taken 9/6/2023 0600 by Sapphire Neil RN  Safety Promotion/Fall Prevention: safety round/check completed  Taken 9/6/2023 0500 by Sapphire Neil RN  Safety Promotion/Fall Prevention: safety round/check completed  Taken 9/6/2023 0400 by Sapphire Neil RN  Safety Promotion/Fall Prevention: safety round/check completed  Taken 9/6/2023 0300 by Sapphire Neil RN  Safety Promotion/Fall Prevention: safety round/check completed  Taken 9/6/2023 0200 by Sapphire Neil RN  Safety Promotion/Fall Prevention: safety round/check completed  Taken 9/6/2023 0100 by Sapphire Neil RN  Safety Promotion/Fall Prevention: safety round/check completed  Taken 9/6/2023 0000 by Sapphire Neil RN  Safety Promotion/Fall Prevention: safety round/check completed  Taken 9/5/2023 2300 by Sapphire Neil RN  Safety Promotion/Fall Prevention: safety round/check completed  Taken 9/5/2023 2200 by Sapphire Neil RN  Safety Promotion/Fall Prevention: safety round/check completed  Taken 9/5/2023 2100 by Sapphire Neil RN  Safety Promotion/Fall Prevention: safety round/check completed  Taken 9/5/2023 2000 by Sapphire Neil RN  Safety Promotion/Fall Prevention:   activity supervised   assistive device/personal items within reach   clutter free environment maintained   nonskid shoes/slippers when out of bed   room near nurse's station   safety round/check completed   treat reversible contributory factors  Taken 9/5/2023 1900 by Sapphire Neil RN  Safety Promotion/Fall Prevention:   activity supervised   room door open   room near nurse's station   room organization consistent   safety round/check completed  Intervention: Prevent Skin  Injury  Recent Flowsheet Documentation  Taken 9/6/2023 0400 by Sapphire Neil RN  Body Position: position changed independently  Taken 9/5/2023 2000 by Sapphire Neil RN  Body Position: position changed independently  Taken 9/5/2023 1900 by Sapphire Neil RN  Body Position: position changed independently  Intervention: Prevent and Manage VTE (Venous Thromboembolism) Risk  Recent Flowsheet Documentation  Taken 9/5/2023 2000 by Sapphire Neil RN  VTE Prevention/Management: (Eliquis) other (see comments)  Goal: Optimal Comfort and Wellbeing  9/6/2023 0644 by Sapphire Neil RN  Outcome: Progressing  9/6/2023 0643 by Sapphire Neil RN  Outcome: Progressing  Intervention: Provide Person-Centered Care  Recent Flowsheet Documentation  Taken 9/6/2023 0400 by Sapphire Neil RN  Trust Relationship/Rapport: care explained  Taken 9/6/2023 0000 by Sapphire Neil RN  Trust Relationship/Rapport: care explained  Taken 9/5/2023 2000 by Sapphire Neil RN  Trust Relationship/Rapport: care explained  Goal: Readiness for Transition of Care  9/6/2023 0644 by Sapphire Neil RN  Outcome: Progressing  9/6/2023 0643 by Sapphire Neil RN  Outcome: Progressing     Problem: Excessive Substance Use  Goal: Optimized Energy Level (Excessive Substance Use)  9/6/2023 0644 by Sapphire Neil RN  Outcome: Progressing  9/6/2023 0643 by Sapphire Neil RN  Outcome: Progressing  Goal: Improved Behavioral Control (Excessive Substance Use)  9/6/2023 0644 by Sapphire Neil RN  Outcome: Progressing  9/6/2023 0643 by Sapphire Neil RN  Outcome: Progressing  Goal: Increased Participation and Engagement (Excessive Substance Use)  9/6/2023 0644 by Sapphire Neil RN  Outcome: Progressing  9/6/2023 0643 by Sapphire Neil RN  Outcome: Progressing  Goal: Improved Physiologic Symptoms (Excessive Substance Use)  9/6/2023 0644 by Sapphire Neil RN  Outcome:  Progressing  9/6/2023 0643 by Sapphire Neil RN  Outcome: Progressing  Goal: Enhanced Social, Occupational or Functional Skills (Excessive Substance Use)  9/6/2023 0644 by Sapphire Neil RN  Outcome: Progressing  9/6/2023 0643 by Sapphire Neil RN  Outcome: Progressing  Intervention: Promote Social, Occupational and Functional Ability  Recent Flowsheet Documentation  Taken 9/6/2023 0400 by Sapphire Neil RN  Trust Relationship/Rapport: care explained  Taken 9/6/2023 0000 by Sapphire Neil RN  Trust Relationship/Rapport: care explained  Taken 9/5/2023 2000 by Sapphire Neil RN  Trust Relationship/Rapport: care explained   Goal Outcome Evaluation:      Plan of Care Reviewed With: patient

## 2023-09-06 NOTE — ANESTHESIA POSTPROCEDURE EVALUATION
Patient: Дмитрий Jessica    Procedure: * No procedures listed *       Anesthesia Type:  MAC    Note:  Disposition: ICU            ICU Sign Out: Anesthesiologist/ICU physician sign out WAS performed   Postop Pain Control: Uneventful            Sign Out: Well controlled pain   PONV: No   Neuro/Psych: Uneventful            Sign Out: Acceptable/Baseline neuro status   Airway/Respiratory: Uneventful            Sign Out: Acceptable/Baseline resp. status   CV/Hemodynamics: Uneventful            Sign Out: Acceptable CV status; No obvious hypovolemia; No obvious fluid overload   Other NRE: NONE   DID A NON-ROUTINE EVENT OCCUR? No           Last vitals:  Vitals:    09/06/23 0630 09/06/23 0700 09/06/23 0800   BP: 100/65 104/72 105/73   Pulse: 80 88 69   Resp: 24 25 22   Temp:   98.7  F (37.1  C)   SpO2: (!) 87% 94% (!) 89%       Electronically Signed By: ODIN MILLER CRNA  September 6, 2023  8:48 AM

## 2023-09-07 VITALS
BODY MASS INDEX: 31.12 KG/M2 | SYSTOLIC BLOOD PRESSURE: 135 MMHG | DIASTOLIC BLOOD PRESSURE: 71 MMHG | TEMPERATURE: 99.2 F | OXYGEN SATURATION: 99 % | HEIGHT: 71 IN | RESPIRATION RATE: 18 BRPM | HEART RATE: 80 BPM | WEIGHT: 222.3 LBS

## 2023-09-07 LAB
ALBUMIN SERPL BCG-MCNC: 3.7 G/DL (ref 3.5–5.2)
ALP SERPL-CCNC: 108 U/L (ref 40–129)
ALT SERPL W P-5'-P-CCNC: 32 U/L (ref 0–70)
ANION GAP SERPL CALCULATED.3IONS-SCNC: 6 MMOL/L (ref 7–15)
AST SERPL W P-5'-P-CCNC: 37 U/L (ref 0–45)
ATRIAL RATE - MUSE: 75 BPM
BACTERIA BLD CULT: NO GROWTH
BILIRUB SERPL-MCNC: 1.1 MG/DL
BUN SERPL-MCNC: 17.7 MG/DL (ref 8–23)
CALCIUM SERPL-MCNC: 9.3 MG/DL (ref 8.8–10.2)
CHLORIDE SERPL-SCNC: 100 MMOL/L (ref 98–107)
CREAT SERPL-MCNC: 0.81 MG/DL (ref 0.67–1.17)
DEPRECATED HCO3 PLAS-SCNC: 31 MMOL/L (ref 22–29)
DIASTOLIC BLOOD PRESSURE - MUSE: NORMAL MMHG
EGFRCR SERPLBLD CKD-EPI 2021: >90 ML/MIN/1.73M2
ERYTHROCYTE [DISTWIDTH] IN BLOOD BY AUTOMATED COUNT: 13.7 % (ref 10–15)
GLUCOSE SERPL-MCNC: 116 MG/DL (ref 70–99)
HCT VFR BLD AUTO: 37.3 % (ref 40–53)
HGB BLD-MCNC: 13.2 G/DL (ref 13.3–17.7)
INTERPRETATION ECG - MUSE: NORMAL
MAGNESIUM SERPL-MCNC: 2 MG/DL (ref 1.7–2.3)
MCH RBC QN AUTO: 32.6 PG (ref 26.5–33)
MCHC RBC AUTO-ENTMCNC: 35.4 G/DL (ref 31.5–36.5)
MCV RBC AUTO: 92 FL (ref 78–100)
P AXIS - MUSE: 78 DEGREES
PLATELET # BLD AUTO: 191 10E3/UL (ref 150–450)
POTASSIUM SERPL-SCNC: 3.9 MMOL/L (ref 3.4–5.3)
PR INTERVAL - MUSE: 216 MS
PROT SERPL-MCNC: 6.4 G/DL (ref 6.4–8.3)
QRS DURATION - MUSE: 68 MS
QT - MUSE: 366 MS
QTC - MUSE: 408 MS
R AXIS - MUSE: 38 DEGREES
RBC # BLD AUTO: 4.05 10E6/UL (ref 4.4–5.9)
SODIUM SERPL-SCNC: 137 MMOL/L (ref 136–145)
SYSTOLIC BLOOD PRESSURE - MUSE: NORMAL MMHG
T AXIS - MUSE: 44 DEGREES
VENTRICULAR RATE- MUSE: 75 BPM
WBC # BLD AUTO: 6.3 10E3/UL (ref 4–11)

## 2023-09-07 PROCEDURE — 83735 ASSAY OF MAGNESIUM: CPT | Performed by: STUDENT IN AN ORGANIZED HEALTH CARE EDUCATION/TRAINING PROGRAM

## 2023-09-07 PROCEDURE — 99239 HOSP IP/OBS DSCHRG MGMT >30: CPT | Performed by: STUDENT IN AN ORGANIZED HEALTH CARE EDUCATION/TRAINING PROGRAM

## 2023-09-07 PROCEDURE — 250N000013 HC RX MED GY IP 250 OP 250 PS 637: Performed by: STUDENT IN AN ORGANIZED HEALTH CARE EDUCATION/TRAINING PROGRAM

## 2023-09-07 PROCEDURE — 85027 COMPLETE CBC AUTOMATED: CPT | Performed by: STUDENT IN AN ORGANIZED HEALTH CARE EDUCATION/TRAINING PROGRAM

## 2023-09-07 PROCEDURE — 999N000157 HC STATISTIC RCP TIME EA 10 MIN

## 2023-09-07 PROCEDURE — 94640 AIRWAY INHALATION TREATMENT: CPT | Mod: 76

## 2023-09-07 PROCEDURE — 250N000013 HC RX MED GY IP 250 OP 250 PS 637: Performed by: INTERNAL MEDICINE

## 2023-09-07 PROCEDURE — 36415 COLL VENOUS BLD VENIPUNCTURE: CPT | Performed by: STUDENT IN AN ORGANIZED HEALTH CARE EDUCATION/TRAINING PROGRAM

## 2023-09-07 PROCEDURE — 80053 COMPREHEN METABOLIC PANEL: CPT | Performed by: STUDENT IN AN ORGANIZED HEALTH CARE EDUCATION/TRAINING PROGRAM

## 2023-09-07 RX ORDER — METOPROLOL SUCCINATE 25 MG/1
25 TABLET, EXTENDED RELEASE ORAL DAILY
Qty: 90 TABLET | Refills: 4 | Status: SHIPPED | OUTPATIENT
Start: 2023-09-07

## 2023-09-07 RX ORDER — METOPROLOL SUCCINATE 25 MG/1
25 TABLET, EXTENDED RELEASE ORAL DAILY
Status: DISCONTINUED | OUTPATIENT
Start: 2023-09-07 | End: 2023-09-07 | Stop reason: HOSPADM

## 2023-09-07 RX ADMIN — MULTIPLE VITAMINS W/ MINERALS TAB 1 TABLET: TAB at 10:01

## 2023-09-07 RX ADMIN — APIXABAN 5 MG: 5 TABLET, FILM COATED ORAL at 10:01

## 2023-09-07 RX ADMIN — FOLIC ACID 1 MG: 1 TABLET ORAL at 10:01

## 2023-09-07 RX ADMIN — ATORVASTATIN CALCIUM 80 MG: 40 TABLET, FILM COATED ORAL at 10:01

## 2023-09-07 RX ADMIN — PANTOPRAZOLE SODIUM 40 MG: 40 TABLET, DELAYED RELEASE ORAL at 06:36

## 2023-09-07 RX ADMIN — UMECLIDINIUM BROMIDE AND VILANTEROL TRIFENATATE 1 PUFF: 62.5; 25 POWDER RESPIRATORY (INHALATION) at 08:13

## 2023-09-07 RX ADMIN — METOPROLOL SUCCINATE 25 MG: 25 TABLET, EXTENDED RELEASE ORAL at 10:01

## 2023-09-07 ASSESSMENT — ACTIVITIES OF DAILY LIVING (ADL)
ADLS_ACUITY_SCORE: 25

## 2023-09-07 NOTE — DISCHARGE SUMMARY
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)    SIRS (systemic inflammatory response syndrome) (H) (9/2/2023)    Fever, unspecified fever cause (9/2/2023)    Hypomagnesemia (9/3/2023)    Generalized weakness (2/16/2021)    Coronary atherosclerosis (1/1/2019)    Acute respiratory failure with hypoxia (H) (9/5/2023)      History of Present Illness   Дмитрий Jessica is an 68 year old man with past medical history of atrial fibrillation status post ablation, coronary artery disease, prostate cancer, obesity, Galeas's esophagus and COPD who was admitted to the hospital on 9-2-2023 for atrial fibs/flutter with rapid ventricular response, shortness of breath and initial heart rate of 180.  He was admitted and placed in the ICU on a diltiazem drip and rate subsequently improved with diltiazem and metoprolol.  Prior intolerance to sotalol and amiodarone.  Consulted electrophysiology and given his anticoagulation status with Eliquis DC cardioversion was performed on 9-6-2023 and he was successfully converted to sinus rhythm after 2 attempts.  Suspect alcohol use and hypomagnesemia contributed to his atrial fibrillation recurrence.  On discharge he was started on metoprolol 25 mg daily.  No other changes were made to his medications.  Recommend he follow-up with his PCP within the next week for follow-up.  Ensure he is still in sinus rhythm and consider referral back to electrophysiology where he to return to atrial fibrillation.    The patient also had a fever on admission and was treated with 1 dose of vancomycin and Zosyn on admission  without a clear source.  Antibiotics were not continued and no further fevers occurred.    Hospital Course   Дмитрий Jessica was admitted on 9/2/2023.  The following problems were addressed during his hospitalization:     Atrial fibrillation with RVR (H)  History of RVR.  January 2020 and February 2021 required cardioversion despite high BB and CCB dosing.    Sotalol utilized in 2021, discontinued due to prolonged QT.  Amiodarone provided, but discontinued due to shortness of breath.  Cardiac ablation performed [facility]/14/[facility] at [facility].  Taken off diltiazem and metoprolol.  Continues with Xarelto for anticoagulation.  Initial pulse in the 180s. Despite max diltiazem pulse elevated over 100, so started on metoprolol with improved pulse.  Cardioverted to sinus rhythm on 9/6/23.         Coronary atherosclerosis  Mild nonobstructive coronary artery disease involving the left main and left anterior descending artery seen on CTA at [facility] April 2021  Continue statin     Acute respiratory failure  History of heart failure associated with A-fib with RVR. Not currently treated for HF on admit.  Last echocardiogram May 2021 with normal EF 60-65%  Updated ECHO 9/5 shows normal EF.   Currently on 1 L O2 placed on admit.      Hypertension   Holding home losartan due to institution of diltiazem and metoprolol. Blood pressure 105-110.       Alcohol withdrawal syndrome without complication (H)  Last drink 1 day prior to admission.  On alcohol withdrawal protocol - scoring 0  Elevated bilirubin due to chronic alcohol use       COPD with asthma (H)  Placed on an Anoro Ellipta substituted for home Stiolto (LAMA/LABA combo)       SIRS (systemic inflammatory response syndrome) (H)    Fever, unspecified fever cause  Unclear etiology. Likely respiratory given cough and friend with URI symptoms.  No infiltrate on chest x-ray.  Negative UA.    Received 1 dose of Zosyn and vancomycin in the ED.    Procalcitonin 0.47.  Stable on repeat  next 2 mornings.  Near negative on 9/5 despite no further antibiotics.  Negative urine Legionella and strep antigens       Hypomagnesemia  Admit level 1.4. Improved with replacement. Continue replacement.       Generalized weakness  Resolved at baseline.    Stone Palafox MD    Significant Results and Procedures       Pending Results   These results will be followed up by PCP at follow-up  Unresulted Labs Ordered in the Past 30 Days of this Admission       Date and Time Order Name Status Description    9/2/2023  5:19 PM Blood Culture Peripheral Blood Preliminary             Code Status   Full Code       Primary Care Physician   HOOD CARROLL    Physical Exam   Temp: 99.2  F (37.3  C) Temp src: Tympanic BP: 135/71 Pulse: 80   Resp: 18 SpO2: 99 % O2 Device: None (Room air)    Vitals:    09/05/23 0453 09/06/23 0300 09/07/23 0403   Weight: 98.5 kg (217 lb 1.6 oz) 100.2 kg (221 lb) 100.8 kg (222 lb 4.8 oz)     Vital Signs with Ranges  Temp:  [97.4  F (36.3  C)-99.2  F (37.3  C)] 99.2  F (37.3  C)  Pulse:  [76-83] 80  Resp:  [18-30] 18  BP: (103-135)/(68-79) 135/71  SpO2:  [92 %-99 %] 99 %  I/O last 3 completed shifts:  In: 454 [P.O.:440; I.V.:14]  Out: 600 [Urine:600]    Constitutional: Pleasant 68-year-old man lying in bed no acute distress  Eyes: Anicteric  ENT: Within normal limits  Respiratory: Clear to auscultation  Cardiovascular: Regular rate and rhythm no peripheral edema appreciated  GI: Soft nontender abdomen    Discharge Disposition   Discharged to home  Condition at discharge: Stable    Consultations This Hospital Stay   PHARMACY TO DOSE [facility] IP CONSULT  VIRTUAL ELECTROPHYSIOLOGY ADULT IP CONSULT    Time Spent on this Encounter   IStone MD, personally saw the patient today and spent greater than 30 minutes discharging this patient.    Discharge Orders      Reason for your hospital stay    You are in the hospital for atrial fibrillation.  You are successfully cardioverted back to  sinus rhythm.  I have started metoprolol 25 mg daily to help keep you out of atrial fibrillation.  I recommend following up with your primary care doctor and/or cardiologist for ongoing management.  I have sent the prescription to [facility] in [place].     Follow-up and recommended labs and tests     Follow up with primary care provider, HOOD CARROLL, within 7 days for hospital follow- up.  The following labs/tests are recommended: EKG as needed.     Activity    Your activity upon discharge: activity as tolerated     Diet    Follow this diet upon discharge: Orders Placed This Encounter      Regular Diet Adult     Discharge Medications   Discharge Medication List as of 9/7/2023 10:40 AM        START taking these medications    Details   metoprolol succinate ER (TOPROL XL) 25 MG 24 hr tablet Take 1 tablet (25 mg) by mouth daily, Disp-90 tablet, R-4, E-Prescribe           CONTINUE these medications which have NOT CHANGED    Details   albuterol (PROAIR HFA/PROVENTIL HFA/VENTOLIN HFA) 108 (90 Base) MCG/ACT inhaler Inhale 2 puffs into the lungs every 4 hours as needed, Historical      apixaban ANTICOAGULANT (ELIQUIS) 5 MG tablet Take 5 mg by mouth 2 times daily, Historical      Cholecalciferol (D 1000) 25 MCG (1000 UT) CAPS Take 1,000 Units by mouth daily, Historical      losartan (COZAAR) 50 MG tablet Take 50 mg by mouth 2 times daily, Historical      pantoprazole (PROTONIX) 40 MG EC tablet Take 40 mg by mouth daily, Historical      propylene glycol (SYSTANE BALANCE) 0.6 % SOLN ophthalmic solution Place 1 drop into both eyes daily as needed for dry eyes, Historical      rosuvastatin (CRESTOR) 20 MG tablet Take 20 mg by mouth daily, Historical      thiamine (B-1) 100 MG tablet Take 100 mg by mouth daily, Historical      tiotropium-olodaterol 2.5-2.5 MCG/ACT AERS Inhale 2 puffs into the lungs daily, Historical           Allergies   Allergies   Allergen Reactions    Bacitracin Swelling, Other (See Comments) and  Unknown     throat swelled up    Cephalexin Anaphylaxis    Sotalol Other (See Comments)     Qt prolongation in February 2021    Codeine Nausea and Hives    Rivaroxaban Other (See Comments)     Hair loss     Data   Most Recent 3 CBC's:  Recent Labs   Lab Test 09/07/23  0527 09/06/23  0520 09/05/23  0454   WBC 6.3 6.9 7.2   HGB 13.2* 14.3 14.5   MCV 92 91 92    194 187      Most Recent 3 BMP's:  Recent Labs   Lab Test 09/07/23  0527 09/06/23  0520 09/05/23  1453 09/05/23  0454    134*  --  133*   POTASSIUM 3.9 3.8 4.0 3.1*   CHLORIDE 100 95*  --  91*   CO2 31* 33*  --  32*   BUN 17.7 18.8  --  19.1   CR 0.81 0.81  --  0.83   ANIONGAP 6* 6*  --  10   TACO 9.3 9.2  --  8.9   * 133*  --  134*     Most Recent 2 LFT's:  Recent Labs   Lab Test 09/07/23 0527 09/03/23  0545   AST 37 24   ALT 32 17   ALKPHOS 108 75   BILITOTAL 1.1 1.6*     Most Recent INR's and Anticoagulation Dosing History:  Anticoagulation Dose History           No data to display              Most Recent 3 Troponin's:No lab results found.  Most Recent Cholesterol Panel:No lab results found.  Most Recent 6 Bacteria Isolates From Any Culture (See EPIC Reports for Culture Details):No lab results found.  Most Recent TSH, T4 and A1c Labs:No lab results found.  Results for orders placed or performed during the hospital encounter of 09/02/23   XR Chest Port 1 View    Narrative    PROCEDURE INFORMATION:   Exam: XR Chest   Exam date and time: 9/2/2023 6:15 PM   Age: 68 years old   Clinical indication: Other: Fever     TECHNIQUE:   Imaging protocol: Radiologic exam of the chest.   Views: 1 view.     COMPARISON:   No relevant prior studies available.     FINDINGS:   Lungs: The lungs are well expanded, and demonstrate prominence of the pulmonary   vasculature. No focal consolidation.   Pleural spaces: No large pleural effusion. No pneumothorax.   Heart/Mediastinum: Cardiac silhouette is prominent in size and contour.   Bones/joints: Multilevel  osseous degenerative changes.        Impression    IMPRESSION:   Prominence of the cardiac silhouette may reflect cardiomegaly and/or   pericardial effusion, with findings compatible with pulmonary vascular   congestion.  No consolidation.    THIS DOCUMENT HAS BEEN ELECTRONICALLY SIGNED BY CHRISTIANO CROSS MD   Echocardiogram Complete     Value    LVEF  55-60%    Narrative    [id number]  [id number]  [id number]  [id number]^KENNETH^SERENITY^S     [facility]  [address] [place] [address]     Name: BERNARD ARGUETA  MRN: [id number]  : 1955  Study Date: 2023 07:20 AM  Age: 68 yrs  Gender: Male  Patient Location: [facility]  Reason For Study: Atrial Fibrillation  Ordering Physician: SERENITY COOPER  Performed By: Nubia Johnson     BSA: 2.2 m2  Height: 71 in  Weight: 217 lb  HR: 95  BP: 118/84 mmHg  ______________________________________________________________________________  Procedure  Echocardiogram with two-dimensional, color and spectral Doppler performed.  ______________________________________________________________________________  Interpretation Summary  Global and regional left ventricular function is normal with an EF of 55-60%.  Mild concentric wall thickening consistent with left ventricular hypertrophy  is present.  Global right ventricular function is borderline reduced.  Mild dilatation of the aorta is present. Ascending aorta 4.1 cm.  Trivial pericardial effusion is present.  ______________________________________________________________________________  Left Ventricle  Global and regional left ventricular function is normal with an EF of 55-60%.  Borderline left ventricular dilation is present. Mild concentric wall  thickening consistent with left ventricular hypertrophy is present. Diastolic  function not assessed due to arrhythmia.     Right Ventricle  The right ventricle is normal size. Global right ventricular function is  borderline reduced.     Atria  Both atria  appear normal.     Mitral Valve  The mitral valve is normal. Trace mitral insufficiency is present.     Aortic Valve  Aortic valve sclerosis is present. On Doppler interrogation, there is no  significant stenosis or regurgitation.     Tricuspid Valve  The tricuspid valve is normal. Trace tricuspid insufficiency is present. The  right ventricular systolic pressure is approximated at 15.4 mmHg plus the  right atrial pressure. Pulmonary artery systolic pressure is normal.     Pulmonic Valve  The pulmonic valve is normal. Trace pulmonic insufficiency is present.     Vessels  Mild dilatation of the aorta is present. Ascending aorta 4.1 cm. IVC diameter  and respiratory changes fall into an intermediate range suggesting an RA  pressure of 8 mmHg.     Pericardium  Trivial pericardial effusion is present.     ______________________________________________________________________________  MMode/2D Measurements & Calculations  IVSd: 1.0 cm  LVIDd: 5.7 cm  LVIDs: 4.6 cm  LVPWd: 1.2 cm  FS: 18.2 %  LV mass(C)d: 251.5 grams  LV mass(C)dI: 115.2 grams/m2  Ao root diam: 3.9 cm  asc Aorta Diam: 4.1 cm     LVOT diam: 2.0 cm  LVOT area: 3.1 cm2  Ao root diam Index (cm/m2): 1.8  asc Aorta Diam Index (cm/m2): 1.9  LA Volume (BP): 41.6 ml  LA Volume Index (BP): 19.1 ml/m2  RWT: 0.42  TAPSE: 1.3 cm     Doppler Measurements & Calculations  MV E max lonny: 87.3 cm/sec     MV dec slope: 532.0 cm/sec2  MV dec time: 0.16 sec  Ao V2 max: 78.4 cm/sec  Ao max P.0 mmHg  Ao V2 mean: 55.2 cm/sec  Ao mean P.3 mmHg  Ao V2 VTI: 13.0 cm  BRII(I,D): 2.9 cm2  BRII(V,D): 3.1 cm2  LV V1 max P.5 mmHg  LV V1 max: 77.7 cm/sec  LV V1 VTI: 12.1 cm  SV(LVOT): 38.1 ml  SI(LVOT): 17.5 ml/m2  PA acc time: 0.11 sec  TR max lonny: 196.0 cm/sec  TR max PG: 15.4 mmHg  AV Lonny Ratio (DI): 0.99  BRII Index (cm2/m2): 1.3  E/E' av.1  Lateral E/e': 11.8  Medial E/e': 14.3     ______________________________________________________________________________  Report  approved by: MD Beto Tripp 09/05/2023 09:05 AM

## 2023-09-07 NOTE — PROGRESS NOTES
:    Met with patient to discuss discharge planning needs. Patient states that he has a friend to pick him up. He stated he wasn't interested in any resources or services after he discharges from the hospital.     No further needs from .     MABLE Drake on 9/7/2023 at 11:11 AM

## 2023-09-07 NOTE — PLAN OF CARE
"Goal Outcome Evaluation:           Overall Patient Progress: improvingOverall Patient Progress: improving    Patient has remained alert and oriented.  Ciwa scores of 0.  Patient has denied any pain or discomfort.  No reports from telemetry and patient has remained in sinus rhythm this shift.  Vitals have been stable and afebrile.  Patient son here for transportation to take patient back home, both in agreement with discharging home and son will help as needed at home.      /71   Pulse 80   Temp 99.2  F (37.3  C) (Tympanic)   Resp 18   Ht 1.803 m (5' 11\")   Wt 100.8 kg (222 lb 4.8 oz)   SpO2 99%   BMI 31.00 kg/m             "

## 2023-09-07 NOTE — PHARMACY - DISCHARGE MEDICATION RECONCILIATION AND EDUCATION
Pharmacy:  Discharge Counseling and Medication Reconciliation    Дмитрий Jessica  [address] [place] [address]  [phone] (home)   68 year old male  PCP: Narayan Carlisle    Allergies: Bacitracin, Cephalexin, Sotalol, Codeine, and Rivaroxaban    Discharge Counseling:    Pharmacist met with patient today to review the medication portion of the After Visit Summary (with an emphasis on NEW medications) and to address patient's questions/concerns.    Summary of Education: counseled patient on new medication of Metoprolol, including indication, administration, and possible common side effects. Counseled that this medication will help reduce heart rate, but can also lower blood pressure- patient advised to monitor for dizziness and light-headedness. Also reviewed that this medication can sometimes make patients feel fatigued but stressed the importance of taking consistently. Patient advised to swallow tablet whole and not to crush or chew.     Materials Provided:  MedCounselor sheets printed from Clinical Pharmacology on: Metoprolol    Discharge Medication Reconciliation:    It has been determined that the patient has an adequate supply of medications available or which can be obtained from the patient's preferred pharmacy, which he has confirmed as: [facility]    Thank you for the consult.    Oseas Baum [facility]........September 7, 2023 9:58 AM

## 2023-09-07 NOTE — PROGRESS NOTES
SAFETY CHECKLIST  ID Bands and Risk clasps correct and in place (DNR, Fall risk, Allergy, Latex, Limb):  Yes  All Lines Reconciled and labeled correctly: Yes  Whiteboard updated:Yes  Environmental interventions: Yes  Verify Tele #: 2    Blaire Reynaga RN on 9/7/2023 at 7:52 AM

## 2023-09-07 NOTE — PROGRESS NOTES
[facility] DISCHARGE NOTE    Patient discharged to home at 11:49 AM via wheel chair. Accompanied by son and staff. Discharge instructions reviewed with patient, opportunity offered to ask questions, patient verbalizes understanding of discharge instructions with no further questions. Prescriptions sent to patients preferred pharmacy. All belongings sent with patient.    Tracy Spencer RN

## 2023-09-07 NOTE — PLAN OF CARE
"Goal Outcome Evaluation:      Plan of Care Reviewed With: patient    Overall Patient Progress: improvingOverall Patient Progress: improving    Outcome Evaluation: A&O, makes needs known, slurred speech-baseline. Up with SBA, steady on feet. Telemetry #2 in place, HR ranging 70s-90s. VSS on RA. CIWA scores 0 t/o shift.      Problem: Plan of Care - These are the overarching goals to be used throughout the patient stay.    Goal: Plan of Care Review  Description: The Plan of Care Review/Shift note should be completed every shift.  The Outcome Evaluation is a brief statement about your assessment that the patient is improving, declining, or no change.  This information will be displayed automatically on your shift note.  Outcome: Progressing  Flowsheets (Taken 9/7/2023 0053)  Outcome Evaluation: A&O, makes needs known, slurred speech-baseline. Up with SBA, steady on feet. Telemetry #2 in place, HR ranging 70s-90s. VSS on RA. CIWA scores 0 t/o shift.  Plan of Care Reviewed With: patient  Overall Patient Progress: improving  Goal: Patient-Specific Goal (Individualized)  Description: You can add care plan individualizations to a care plan. Examples of Individualization might be:  \"Parent requests to be called daily at 9am for status\", \"I have a hard time hearing out of my right ear\", or \"Do not touch me to wake me up as it startles me\".  Outcome: Progressing  Goal: Absence of Hospital-Acquired Illness or Injury  Outcome: Progressing  Intervention: Identify and Manage Fall Risk  Recent Flowsheet Documentation  Taken 9/6/2023 1924 by Angelique Guzman RN  Safety Promotion/Fall Prevention: safety round/check completed  Intervention: Prevent Skin Injury  Recent Flowsheet Documentation  Taken 9/6/2023 1924 by Angelique Guzman, RN  Body Position: position changed independently  Intervention: Prevent and Manage VTE (Venous Thromboembolism) Risk  Recent Flowsheet Documentation  Taken 9/6/2023 1924 by Angelique Guzman, RN  VTE " Prevention/Management: (Eliquis) other (see comments)  Goal: Optimal Comfort and Wellbeing  Outcome: Progressing  Intervention: Provide Person-Centered Care  Recent Flowsheet Documentation  Taken 9/6/2023 1924 by Angelique Guzman, RN  Trust Relationship/Rapport: care explained  Goal: Readiness for Transition of Care  Outcome: Progressing     Problem: Excessive Substance Use  Goal: Optimized Energy Level (Excessive Substance Use)  Outcome: Progressing  Goal: Improved Behavioral Control (Excessive Substance Use)  Outcome: Progressing  Goal: Increased Participation and Engagement (Excessive Substance Use)  Outcome: Progressing  Goal: Improved Physiologic Symptoms (Excessive Substance Use)  Outcome: Progressing  Goal: Enhanced Social, Occupational or Functional Skills (Excessive Substance Use)  Outcome: Progressing  Intervention: Promote Social, Occupational and Functional Ability  Recent Flowsheet Documentation  Taken 9/6/2023 1924 by Angelique Guzman, RN  Trust Relationship/Rapport: care explained

## 2024-03-02 ENCOUNTER — HEALTH MAINTENANCE LETTER (OUTPATIENT)
Age: 69
End: 2024-03-02

## 2025-03-15 ENCOUNTER — HEALTH MAINTENANCE LETTER (OUTPATIENT)
Age: 70
End: 2025-03-15

## (undated) RX ORDER — IPRATROPIUM BROMIDE AND ALBUTEROL SULFATE 2.5; .5 MG/3ML; MG/3ML
SOLUTION RESPIRATORY (INHALATION)
Status: DISPENSED
Start: 2023-09-02

## (undated) RX ORDER — DILTIAZEM HYDROCHLORIDE 100 MG/1
INJECTION, POWDER, LYOPHILIZED, FOR SOLUTION INTRAVENOUS
Status: DISPENSED
Start: 2023-09-02